# Patient Record
Sex: MALE | Race: WHITE | Employment: FULL TIME | URBAN - METROPOLITAN AREA
[De-identification: names, ages, dates, MRNs, and addresses within clinical notes are randomized per-mention and may not be internally consistent; named-entity substitution may affect disease eponyms.]

---

## 2019-04-29 ENCOUNTER — OFFICE VISIT (OUTPATIENT)
Dept: ORTHOPEDIC SURGERY | Age: 39
End: 2019-04-29
Payer: COMMERCIAL

## 2019-04-29 VITALS
HEIGHT: 71 IN | HEART RATE: 86 BPM | WEIGHT: 291 LBS | SYSTOLIC BLOOD PRESSURE: 143 MMHG | BODY MASS INDEX: 40.74 KG/M2 | DIASTOLIC BLOOD PRESSURE: 97 MMHG

## 2019-04-29 DIAGNOSIS — E66.01 MORBID OBESITY WITH BMI OF 40.0-44.9, ADULT (HCC): Chronic | ICD-10-CM

## 2019-04-29 DIAGNOSIS — S83.242A TEAR OF MEDIAL MENISCUS OF LEFT KNEE, CURRENT, UNSPECIFIED TEAR TYPE, INITIAL ENCOUNTER: ICD-10-CM

## 2019-04-29 DIAGNOSIS — M25.562 LEFT KNEE PAIN, UNSPECIFIED CHRONICITY: Primary | ICD-10-CM

## 2019-04-29 PROCEDURE — 20610 DRAIN/INJ JOINT/BURSA W/O US: CPT | Performed by: ORTHOPAEDIC SURGERY

## 2019-04-29 PROCEDURE — 99203 OFFICE O/P NEW LOW 30 MIN: CPT | Performed by: ORTHOPAEDIC SURGERY

## 2019-04-29 RX ORDER — BETAMETHASONE SODIUM PHOSPHATE AND BETAMETHASONE ACETATE 3; 3 MG/ML; MG/ML
12 INJECTION, SUSPENSION INTRA-ARTICULAR; INTRALESIONAL; INTRAMUSCULAR; SOFT TISSUE ONCE
Status: DISCONTINUED | OUTPATIENT
Start: 2019-04-29 | End: 2019-10-03 | Stop reason: HOSPADM

## 2019-04-30 NOTE — PROGRESS NOTES
Date of Encounter: 4/29/2019  Patient Name:Dmitry Wiley  Medical Record Number: [id number]    Chief Complaint   Patient presents with    Knee Pain     New, LT knee pain on and off since 2015 when he moved. History of Present Illness:  Ladarius Lemon is a 46 y.o. male here for evaluation of his left knee. Describes pain mostly along the posterior and medial aspect of his left knee when he is working at [employer]. 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 He recalls an injury when he was moving into a residence in 2015. He recalls twisting his knee and it was sore for several days. This seemed to resolve once own. He denies any catching or locking symptoms. The pain really does not bother him while he is at work but he notices it much more the next day when he 1st gets up. He denies fevers or chills. No numbness or tingling that radiates down the leg. No significant hip pain. He's had no physical therapy, no injections and no prior surgery on this knee. He's tried ice and over-the-counter anti-inflammatories with limited benefit. History reviewed. No pertinent past medical history. History reviewed. No pertinent surgical history. No current outpatient medications on file. Current Facility-Administered Medications   Medication Dose Route Frequency Provider Last Rate Last Dose    betamethasone acetate-betamethasone sodium phosphate (CELESTONE) injection 12 mg  12 mg Intra-articular Once Joel Scherer MD          allergies, social and family histories were reviewed and updated as appropriate. Review of Systems:  Relevant review of systems reviewed and available in the patient's chart and scanned in under the MEDIA tab today.     Vital Signs:  BP (!) 143/97   Pulse 86   Ht 5' 11\" (1.803 m)   Wt 291 lb (132 kg)   BMI 40.59 kg/m²     General Exam:   Constitutional: He is adequately groomed with class 3 morbid obesity noted. Mental Status: He is oriented to time, place and person. Normal mentation and affect for age. Lymphatic: The lymphatic examination bilaterally reveals all areas to be without enlargement or induration. Vascular: Examination reveals no swelling or calf tenderness. Peripheral pulses are palpable and 2+. Neurological: He has good coordination and balance. There is no focal weakness or sensory deficit. Left Knee Examination:    Inspection:  Knee shows neutral alignment  Decreased muscle bulk for his age, but matches his level of physical deconditioning. No erythema. Mild effusion. Palpation:  Moderate tenderness to palpation along the medial joint line more towards the posterior aspect. No tenderness over the extensor mechanism or lateral compartment. Range of Motion:  0 to 120 with mild pain on deep flexion    Strength:  Quad 5-/ 5  ; Hamstrings 5- / 5. Gross motor to hip and ankle intact, no pain with logroll the hip. Stinchfield examination negative. Special Tests:      Negative Lachman    negative anterior drawer    no posterior sag    no posterior drawer   Negative patellar grind.  does not open to valgus or varus stress at 0 or 30°    Pia's provokes medial joint line pain    negative Homans    Posterior tibial pulses are +2/4 capillary refill is brisk sensation is intact. Skin: There are no rashes, ulcerations or lesions. Gait: Tandem gait with no significant limp. Radiology:     X-rays obtained today and reviewed in office:  Views 3 Left  Knee with comparison AP and skyline views of the right knee  Impression No evidence for acute fracture or subluxation / dislocation. Left knee shows slight narrowing of medial compartment joint space with both knees showing varus habitus. No significant joint effusion of left knee on the lateral view. No lytic or blastic lesions. Impression:  Encounter Diagnoses   Name Primary?     Left knee pain, unspecified chronicity Yes    Tear of medial meniscus of left knee, current, unspecified tear type, initial encounter        Office Procedures:  Orders Placed This Encounter   Procedures    XR Knee Bilateral Standing     Standing Status:   Future     Number of Occurrences:   1     Standing Expiration Date:   5/29/2019    XR KNEE LEFT (1-2 VIEWS)     Standing Status:   Future     Number of Occurrences:   1     Standing Expiration Date:   5/29/2019    73991 - [place] DRAIN/INJECT LARGE JOINT/BURSA       Treatment Plan:   We discussed treatment options. At this point he is likely suffered a chronic medial meniscus tear. He may also be developing an associated Baker cyst.  I reviewed with him the nature of this pathology as well as the fact that many middle-age patient's live with meniscal pathology and do not require surgery. We discussed treatment options and he understands that weight loss would certainly help impact the stress on his knees. We also reviewed use of anti-inflammatories which she is already trialed. Another step would be to consider cortisone injection and after reviewing the risks and benefits with him he agreed to receive this today. Left knee was then prepped with Betadine and 2 mL of betamethasone mixed with 5 mL 1% lidocaine plain were carefully aspirated and injected through an inferior medial approach under aseptic technique. He tolerated this well. He was instructed on postinjection after care including ice as needed. He will resume his activities and follow back with me as his symptoms require. If the injection does not provide significant improvement I would go ahead and consider MRI evaluation to determine size of the meniscal tear as well as any associated Baker cyst.  I did inform him that he could require arthroscopic intervention, but with the longevity of his symptoms this is most likely to be an arthroscopic meniscectomy rather than a repair.   He understands that

## 2019-05-06 ENCOUNTER — TELEPHONE (OUTPATIENT)
Dept: ORTHOPEDIC SURGERY | Age: 39
End: 2019-05-06

## 2019-05-06 DIAGNOSIS — S83.242A TEAR OF MEDIAL MENISCUS OF LEFT KNEE, CURRENT, UNSPECIFIED TEAR TYPE, INITIAL ENCOUNTER: Primary | ICD-10-CM

## 2019-05-06 NOTE — TELEPHONE ENCOUNTER
Patient called states that the injection did not help. He would like to proceed with an MRI. He states that he would like MRI order sent to [facility] in [facility] if possible.     Please call patient to advise:  [phone]

## 2019-05-07 NOTE — TELEPHONE ENCOUNTER
Called and spoke w/pt offered proscan pt preferred to go to [facility] for MRI. Informed pt since it was an outside source that he would need to come and get the order to take to them so they could go through his insurance. Also that he would need to bring a copy of his images and report if he made a f/u visit. Pt stated he couldn't  the order due to hours. Informed him of after hours. Pt stated if he had any further questions he would ask when he came in to get the order.

## 2019-05-08 ENCOUNTER — TELEPHONE (OUTPATIENT)
Dept: ORTHOPEDIC SURGERY | Age: 39
End: 2019-05-08

## 2019-08-05 ENCOUNTER — TELEPHONE (OUTPATIENT)
Dept: ORTHOPEDIC SURGERY | Age: 39
End: 2019-08-05

## 2019-08-08 ENCOUNTER — TELEPHONE (OUTPATIENT)
Dept: ORTHOPEDIC SURGERY | Age: 39
End: 2019-08-08

## 2019-08-08 NOTE — TELEPHONE ENCOUNTER
Called and informed patient of JDA message.  Patient coming in tomorrow at [facility] office for results

## 2019-08-09 ENCOUNTER — OFFICE VISIT (OUTPATIENT)
Dept: ORTHOPEDIC SURGERY | Age: 39
End: 2019-08-09
Payer: COMMERCIAL

## 2019-08-09 VITALS
BODY MASS INDEX: 41.05 KG/M2 | HEIGHT: 71 IN | HEART RATE: 80 BPM | WEIGHT: 293.2 LBS | SYSTOLIC BLOOD PRESSURE: 119 MMHG | DIASTOLIC BLOOD PRESSURE: 72 MMHG

## 2019-08-09 DIAGNOSIS — S83.242A TEAR OF MEDIAL MENISCUS OF LEFT KNEE, CURRENT, UNSPECIFIED TEAR TYPE, INITIAL ENCOUNTER: Primary | ICD-10-CM

## 2019-08-09 PROCEDURE — 99213 OFFICE O/P EST LOW 20 MIN: CPT | Performed by: ORTHOPAEDIC SURGERY

## 2019-08-09 PROCEDURE — E0114 CRUTCH UNDERARM PAIR NO WOOD: HCPCS | Performed by: ORTHOPAEDIC SURGERY

## 2019-08-12 ENCOUNTER — TELEPHONE (OUTPATIENT)
Dept: ORTHOPEDIC SURGERY | Age: 39
End: 2019-08-12

## 2019-08-13 NOTE — TELEPHONE ENCOUNTER
Spoke to patient and he will pick this up closer to the time of his surgery at the [facility] office. He will call back before that time to have letter completed.

## 2019-09-24 ENCOUNTER — TELEPHONE (OUTPATIENT)
Dept: ORTHOPEDIC SURGERY | Age: 39
End: 2019-09-24

## 2019-09-24 NOTE — TELEPHONE ENCOUNTER
Pt is calling about a handicap sticker to see if it is ready and wanted to know if his parents can pick it up. Please advise.

## 2019-10-02 ENCOUNTER — ANESTHESIA EVENT (OUTPATIENT)
Dept: OPERATING ROOM | Age: 39
End: 2019-10-02
Payer: COMMERCIAL

## 2019-10-03 ENCOUNTER — ANESTHESIA (OUTPATIENT)
Dept: OPERATING ROOM | Age: 39
End: 2019-10-03
Payer: COMMERCIAL

## 2019-10-03 ENCOUNTER — HOSPITAL ENCOUNTER (OUTPATIENT)
Age: 39
Setting detail: OUTPATIENT SURGERY
Discharge: HOME | End: 2019-10-03
Attending: ORTHOPAEDIC SURGERY | Admitting: ORTHOPAEDIC SURGERY
Payer: COMMERCIAL

## 2019-10-03 ENCOUNTER — APPOINTMENT (OUTPATIENT)
Dept: GENERAL RADIOLOGY | Age: 39
End: 2019-10-03
Attending: ORTHOPAEDIC SURGERY
Payer: COMMERCIAL

## 2019-10-03 VITALS
HEART RATE: 77 BPM | BODY MASS INDEX: 40.04 KG/M2 | TEMPERATURE: 98.4 F | OXYGEN SATURATION: 99 % | DIASTOLIC BLOOD PRESSURE: 73 MMHG | HEIGHT: 71 IN | WEIGHT: 286 LBS | SYSTOLIC BLOOD PRESSURE: 108 MMHG | RESPIRATION RATE: 17 BRPM

## 2019-10-03 VITALS
TEMPERATURE: 97.2 F | DIASTOLIC BLOOD PRESSURE: 66 MMHG | SYSTOLIC BLOOD PRESSURE: 139 MMHG | RESPIRATION RATE: 20 BRPM | OXYGEN SATURATION: 99 %

## 2019-10-03 DIAGNOSIS — S83.232A COMPLEX TEAR OF MEDIAL MENISCUS OF LEFT KNEE AS CURRENT INJURY, INITIAL ENCOUNTER: Primary | ICD-10-CM

## 2019-10-03 PROCEDURE — 3600000014 HC SURGERY LEVEL 4 ADDTL 15MIN: Performed by: ORTHOPAEDIC SURGERY

## 2019-10-03 PROCEDURE — 6360000002 HC RX W HCPCS: Performed by: NURSE ANESTHETIST, CERTIFIED REGISTERED

## 2019-10-03 PROCEDURE — 2580000003 HC RX 258: Performed by: ORTHOPAEDIC SURGERY

## 2019-10-03 PROCEDURE — 7100000010 HC PHASE II RECOVERY - FIRST 15 MIN: Performed by: ORTHOPAEDIC SURGERY

## 2019-10-03 PROCEDURE — 2709999900 HC NON-CHARGEABLE SUPPLY: Performed by: ORTHOPAEDIC SURGERY

## 2019-10-03 PROCEDURE — 7100000011 HC PHASE II RECOVERY - ADDTL 15 MIN: Performed by: ORTHOPAEDIC SURGERY

## 2019-10-03 PROCEDURE — 6360000002 HC RX W HCPCS: Performed by: ANESTHESIOLOGY

## 2019-10-03 PROCEDURE — 6360000002 HC RX W HCPCS: Performed by: ORTHOPAEDIC SURGERY

## 2019-10-03 PROCEDURE — 2500000003 HC RX 250 WO HCPCS: Performed by: ORTHOPAEDIC SURGERY

## 2019-10-03 PROCEDURE — 2500000003 HC RX 250 WO HCPCS: Performed by: NURSE ANESTHETIST, CERTIFIED REGISTERED

## 2019-10-03 PROCEDURE — 6370000000 HC RX 637 (ALT 250 FOR IP): Performed by: ANESTHESIOLOGY

## 2019-10-03 PROCEDURE — 3700000000 HC ANESTHESIA ATTENDED CARE: Performed by: ORTHOPAEDIC SURGERY

## 2019-10-03 PROCEDURE — 2720000010 HC SURG SUPPLY STERILE: Performed by: ORTHOPAEDIC SURGERY

## 2019-10-03 PROCEDURE — 3209999900 FLUORO FOR SURGICAL PROCEDURES

## 2019-10-03 PROCEDURE — 7100000000 HC PACU RECOVERY - FIRST 15 MIN: Performed by: ORTHOPAEDIC SURGERY

## 2019-10-03 PROCEDURE — 3600000004 HC SURGERY LEVEL 4 BASE: Performed by: ORTHOPAEDIC SURGERY

## 2019-10-03 PROCEDURE — 7100000001 HC PACU RECOVERY - ADDTL 15 MIN: Performed by: ORTHOPAEDIC SURGERY

## 2019-10-03 PROCEDURE — C1713 ANCHOR/SCREW BN/BN,TIS/BN: HCPCS | Performed by: ORTHOPAEDIC SURGERY

## 2019-10-03 PROCEDURE — 3700000001 HC ADD 15 MINUTES (ANESTHESIA): Performed by: ORTHOPAEDIC SURGERY

## 2019-10-03 DEVICE — SUBSTITUTE BONE KNEE CREATION SCP 5CC: Type: IMPLANTABLE DEVICE | Site: KNEE | Status: FUNCTIONAL

## 2019-10-03 RX ORDER — SODIUM CHLORIDE, SODIUM LACTATE, POTASSIUM CHLORIDE, CALCIUM CHLORIDE 600; 310; 30; 20 MG/100ML; MG/100ML; MG/100ML; MG/100ML
INJECTION, SOLUTION INTRAVENOUS CONTINUOUS
Status: DISCONTINUED | OUTPATIENT
Start: 2019-10-03 | End: 2019-10-03 | Stop reason: HOSPADM

## 2019-10-03 RX ORDER — ONDANSETRON 2 MG/ML
INJECTION INTRAMUSCULAR; INTRAVENOUS PRN
Status: DISCONTINUED | OUTPATIENT
Start: 2019-10-03 | End: 2019-10-03 | Stop reason: SDUPTHER

## 2019-10-03 RX ORDER — PROPOFOL 10 MG/ML
INJECTION, EMULSION INTRAVENOUS PRN
Status: DISCONTINUED | OUTPATIENT
Start: 2019-10-03 | End: 2019-10-03 | Stop reason: SDUPTHER

## 2019-10-03 RX ORDER — GLYCOPYRROLATE 1 MG/5 ML
SYRINGE (ML) INTRAVENOUS PRN
Status: DISCONTINUED | OUTPATIENT
Start: 2019-10-03 | End: 2019-10-03 | Stop reason: SDUPTHER

## 2019-10-03 RX ORDER — SODIUM CHLORIDE 0.9 % (FLUSH) 0.9 %
10 SYRINGE (ML) INJECTION PRN
Status: DISCONTINUED | OUTPATIENT
Start: 2019-10-03 | End: 2019-10-03 | Stop reason: HOSPADM

## 2019-10-03 RX ORDER — FENTANYL CITRATE 50 UG/ML
INJECTION, SOLUTION INTRAMUSCULAR; INTRAVENOUS PRN
Status: DISCONTINUED | OUTPATIENT
Start: 2019-10-03 | End: 2019-10-03 | Stop reason: SDUPTHER

## 2019-10-03 RX ORDER — CEFAZOLIN SODIUM 2 G/100ML
2 INJECTION, SOLUTION INTRAVENOUS
Status: DISCONTINUED | OUTPATIENT
Start: 2019-10-03 | End: 2019-10-03 | Stop reason: DRUGHIGH

## 2019-10-03 RX ORDER — HYDRALAZINE HYDROCHLORIDE 20 MG/ML
5 INJECTION INTRAMUSCULAR; INTRAVENOUS EVERY 10 MIN PRN
Status: DISCONTINUED | OUTPATIENT
Start: 2019-10-03 | End: 2019-10-03 | Stop reason: HOSPADM

## 2019-10-03 RX ORDER — KETAMINE HCL IN NACL, ISO-OSM 100MG/10ML
SYRINGE (ML) INJECTION PRN
Status: DISCONTINUED | OUTPATIENT
Start: 2019-10-03 | End: 2019-10-03 | Stop reason: SDUPTHER

## 2019-10-03 RX ORDER — DEXAMETHASONE SODIUM PHOSPHATE 4 MG/ML
INJECTION, SOLUTION INTRA-ARTICULAR; INTRALESIONAL; INTRAMUSCULAR; INTRAVENOUS; SOFT TISSUE PRN
Status: DISCONTINUED | OUTPATIENT
Start: 2019-10-03 | End: 2019-10-03 | Stop reason: SDUPTHER

## 2019-10-03 RX ORDER — ESMOLOL HYDROCHLORIDE 10 MG/ML
INJECTION INTRAVENOUS PRN
Status: DISCONTINUED | OUTPATIENT
Start: 2019-10-03 | End: 2019-10-03 | Stop reason: SDUPTHER

## 2019-10-03 RX ORDER — MIDAZOLAM HYDROCHLORIDE 1 MG/ML
INJECTION INTRAMUSCULAR; INTRAVENOUS PRN
Status: DISCONTINUED | OUTPATIENT
Start: 2019-10-03 | End: 2019-10-03 | Stop reason: SDUPTHER

## 2019-10-03 RX ORDER — OXYCODONE HYDROCHLORIDE AND ACETAMINOPHEN 5; 325 MG/1; MG/1
1 TABLET ORAL
Status: COMPLETED | OUTPATIENT
Start: 2019-10-03 | End: 2019-10-03

## 2019-10-03 RX ORDER — KETOROLAC TROMETHAMINE 30 MG/ML
INJECTION, SOLUTION INTRAMUSCULAR; INTRAVENOUS PRN
Status: DISCONTINUED | OUTPATIENT
Start: 2019-10-03 | End: 2019-10-03 | Stop reason: SDUPTHER

## 2019-10-03 RX ORDER — BUPIVACAINE HYDROCHLORIDE 2.5 MG/ML
INJECTION, SOLUTION EPIDURAL; INFILTRATION; INTRACAUDAL
Status: COMPLETED | OUTPATIENT
Start: 2019-10-03 | End: 2019-10-03

## 2019-10-03 RX ORDER — OXYCODONE HYDROCHLORIDE AND ACETAMINOPHEN 5; 325 MG/1; MG/1
1 TABLET ORAL EVERY 4 HOURS PRN
Qty: 42 TABLET | Refills: 0 | Status: SHIPPED | OUTPATIENT
Start: 2019-10-03 | End: 2019-10-10

## 2019-10-03 RX ORDER — LIDOCAINE HYDROCHLORIDE 10 MG/ML
0.5 INJECTION, SOLUTION EPIDURAL; INFILTRATION; INTRACAUDAL; PERINEURAL ONCE
Status: DISCONTINUED | OUTPATIENT
Start: 2019-10-03 | End: 2019-10-03 | Stop reason: HOSPADM

## 2019-10-03 RX ORDER — SODIUM CHLORIDE 0.9 % (FLUSH) 0.9 %
10 SYRINGE (ML) INJECTION EVERY 12 HOURS SCHEDULED
Status: DISCONTINUED | OUTPATIENT
Start: 2019-10-03 | End: 2019-10-03 | Stop reason: HOSPADM

## 2019-10-03 RX ORDER — ONDANSETRON 2 MG/ML
4 INJECTION INTRAMUSCULAR; INTRAVENOUS
Status: DISCONTINUED | OUTPATIENT
Start: 2019-10-03 | End: 2019-10-03 | Stop reason: HOSPADM

## 2019-10-03 RX ORDER — IBUPROFEN 800 MG/1
800 TABLET ORAL EVERY 8 HOURS PRN
Qty: 30 TABLET | Refills: 0 | Status: SHIPPED | OUTPATIENT
Start: 2019-10-03 | End: 2019-10-18 | Stop reason: SDUPTHER

## 2019-10-03 RX ORDER — PROCHLORPERAZINE EDISYLATE 5 MG/ML
5 INJECTION INTRAMUSCULAR; INTRAVENOUS
Status: DISCONTINUED | OUTPATIENT
Start: 2019-10-03 | End: 2019-10-03 | Stop reason: HOSPADM

## 2019-10-03 RX ORDER — FENTANYL CITRATE 50 UG/ML
25 INJECTION, SOLUTION INTRAMUSCULAR; INTRAVENOUS EVERY 5 MIN PRN
Status: DISCONTINUED | OUTPATIENT
Start: 2019-10-03 | End: 2019-10-03 | Stop reason: HOSPADM

## 2019-10-03 RX ORDER — MAGNESIUM SULFATE HEPTAHYDRATE 500 MG/ML
INJECTION, SOLUTION INTRAMUSCULAR; INTRAVENOUS PRN
Status: DISCONTINUED | OUTPATIENT
Start: 2019-10-03 | End: 2019-10-03 | Stop reason: SDUPTHER

## 2019-10-03 RX ORDER — HYDROMORPHONE HCL 110MG/55ML
0.5 PATIENT CONTROLLED ANALGESIA SYRINGE INTRAVENOUS EVERY 5 MIN PRN
Status: DISCONTINUED | OUTPATIENT
Start: 2019-10-03 | End: 2019-10-03 | Stop reason: HOSPADM

## 2019-10-03 RX ORDER — LIDOCAINE HYDROCHLORIDE 20 MG/ML
INJECTION, SOLUTION EPIDURAL; INFILTRATION; INTRACAUDAL; PERINEURAL PRN
Status: DISCONTINUED | OUTPATIENT
Start: 2019-10-03 | End: 2019-10-03 | Stop reason: SDUPTHER

## 2019-10-03 RX ORDER — ASPIRIN 325 MG
325 TABLET, DELAYED RELEASE (ENTERIC COATED) ORAL DAILY
Qty: 30 TABLET | Refills: 0 | Status: SHIPPED | OUTPATIENT
Start: 2019-10-03 | End: 2021-04-02

## 2019-10-03 RX ORDER — LIDOCAINE HYDROCHLORIDE 10 MG/ML
1 INJECTION, SOLUTION EPIDURAL; INFILTRATION; INTRACAUDAL; PERINEURAL
Status: DISCONTINUED | OUTPATIENT
Start: 2019-10-03 | End: 2019-10-03 | Stop reason: HOSPADM

## 2019-10-03 RX ORDER — LABETALOL HYDROCHLORIDE 5 MG/ML
5 INJECTION, SOLUTION INTRAVENOUS EVERY 10 MIN PRN
Status: DISCONTINUED | OUTPATIENT
Start: 2019-10-03 | End: 2019-10-03 | Stop reason: HOSPADM

## 2019-10-03 RX ADMIN — SODIUM CHLORIDE, POTASSIUM CHLORIDE, SODIUM LACTATE AND CALCIUM CHLORIDE: 600; 310; 30; 20 INJECTION, SOLUTION INTRAVENOUS at 08:35

## 2019-10-03 RX ADMIN — KETOROLAC TROMETHAMINE 30 MG: 30 INJECTION, SOLUTION INTRAMUSCULAR; INTRAVENOUS at 08:52

## 2019-10-03 RX ADMIN — FENTANYL CITRATE 50 MCG: 50 INJECTION, SOLUTION INTRAMUSCULAR; INTRAVENOUS at 07:38

## 2019-10-03 RX ADMIN — LIDOCAINE HYDROCHLORIDE 100 MG: 20 INJECTION, SOLUTION EPIDURAL; INFILTRATION; INTRACAUDAL; PERINEURAL at 07:34

## 2019-10-03 RX ADMIN — MAGNESIUM SULFATE HEPTAHYDRATE 2 G: 500 INJECTION, SOLUTION INTRAMUSCULAR; INTRAVENOUS at 07:37

## 2019-10-03 RX ADMIN — SODIUM CHLORIDE, POTASSIUM CHLORIDE, SODIUM LACTATE AND CALCIUM CHLORIDE: 600; 310; 30; 20 INJECTION, SOLUTION INTRAVENOUS at 07:28

## 2019-10-03 RX ADMIN — Medication 10 MG: at 07:39

## 2019-10-03 RX ADMIN — DEXAMETHASONE SODIUM PHOSPHATE 4 MG: 4 INJECTION, SOLUTION INTRAMUSCULAR; INTRAVENOUS at 07:40

## 2019-10-03 RX ADMIN — Medication 10 MG: at 08:30

## 2019-10-03 RX ADMIN — Medication 0.2 MG: at 08:29

## 2019-10-03 RX ADMIN — ESMOLOL HYDROCHLORIDE 10 MG: 10 INJECTION, SOLUTION INTRAVENOUS at 08:36

## 2019-10-03 RX ADMIN — Medication 10 MG: at 07:54

## 2019-10-03 RX ADMIN — FENTANYL CITRATE 50 MCG: 50 INJECTION, SOLUTION INTRAMUSCULAR; INTRAVENOUS at 08:18

## 2019-10-03 RX ADMIN — FENTANYL CITRATE 50 MCG: 50 INJECTION, SOLUTION INTRAMUSCULAR; INTRAVENOUS at 08:12

## 2019-10-03 RX ADMIN — MIDAZOLAM HYDROCHLORIDE 2 MG: 1 INJECTION, SOLUTION INTRAMUSCULAR; INTRAVENOUS at 07:25

## 2019-10-03 RX ADMIN — ONDANSETRON 4 MG: 2 INJECTION INTRAMUSCULAR; INTRAVENOUS at 07:40

## 2019-10-03 RX ADMIN — SODIUM CHLORIDE, POTASSIUM CHLORIDE, SODIUM LACTATE AND CALCIUM CHLORIDE: 600; 310; 30; 20 INJECTION, SOLUTION INTRAVENOUS at 06:53

## 2019-10-03 RX ADMIN — PROPOFOL 250 MG: 10 INJECTION, EMULSION INTRAVENOUS at 07:34

## 2019-10-03 RX ADMIN — Medication 3 G: at 07:25

## 2019-10-03 RX ADMIN — ESMOLOL HYDROCHLORIDE 10 MG: 10 INJECTION, SOLUTION INTRAVENOUS at 08:51

## 2019-10-03 RX ADMIN — HYDROMORPHONE HYDROCHLORIDE 0.5 MG: 2 INJECTION INTRAMUSCULAR; INTRAVENOUS; SUBCUTANEOUS at 09:17

## 2019-10-03 RX ADMIN — OXYCODONE HYDROCHLORIDE AND ACETAMINOPHEN 1 TABLET: 5; 325 TABLET ORAL at 09:42

## 2019-10-03 RX ADMIN — HYDROMORPHONE HYDROCHLORIDE 0.5 MG: 2 INJECTION INTRAMUSCULAR; INTRAVENOUS; SUBCUTANEOUS at 09:31

## 2019-10-03 ASSESSMENT — PULMONARY FUNCTION TESTS
PIF_VALUE: 3
PIF_VALUE: 4
PIF_VALUE: 3
PIF_VALUE: 3
PIF_VALUE: 4
PIF_VALUE: 3
PIF_VALUE: 6
PIF_VALUE: 3
PIF_VALUE: 7
PIF_VALUE: 9
PIF_VALUE: 6
PIF_VALUE: 3
PIF_VALUE: 4
PIF_VALUE: 4
PIF_VALUE: 6
PIF_VALUE: 6
PIF_VALUE: 3
PIF_VALUE: 0
PIF_VALUE: 6
PIF_VALUE: 3
PIF_VALUE: 6
PIF_VALUE: 6
PIF_VALUE: 1
PIF_VALUE: 3
PIF_VALUE: 3
PIF_VALUE: 4
PIF_VALUE: 2
PIF_VALUE: 4
PIF_VALUE: 3
PIF_VALUE: 4
PIF_VALUE: 3
PIF_VALUE: 1
PIF_VALUE: 2
PIF_VALUE: 3
PIF_VALUE: 4
PIF_VALUE: 3
PIF_VALUE: 6
PIF_VALUE: 5
PIF_VALUE: 3
PIF_VALUE: 3
PIF_VALUE: 6
PIF_VALUE: 6
PIF_VALUE: 8
PIF_VALUE: 3
PIF_VALUE: 3
PIF_VALUE: 6
PIF_VALUE: 1
PIF_VALUE: 6
PIF_VALUE: 6
PIF_VALUE: 8
PIF_VALUE: 3
PIF_VALUE: 2
PIF_VALUE: 4
PIF_VALUE: 3
PIF_VALUE: 3
PIF_VALUE: 8
PIF_VALUE: 1
PIF_VALUE: 3
PIF_VALUE: 7
PIF_VALUE: 1
PIF_VALUE: 6
PIF_VALUE: 4
PIF_VALUE: 3
PIF_VALUE: 4
PIF_VALUE: 24
PIF_VALUE: 2
PIF_VALUE: 6
PIF_VALUE: 4
PIF_VALUE: 3
PIF_VALUE: 5
PIF_VALUE: 10
PIF_VALUE: 3
PIF_VALUE: 4
PIF_VALUE: 7
PIF_VALUE: 6
PIF_VALUE: 3
PIF_VALUE: 6
PIF_VALUE: 2
PIF_VALUE: 2
PIF_VALUE: 3

## 2019-10-03 ASSESSMENT — PAIN DESCRIPTION - PAIN TYPE
TYPE: SURGICAL PAIN

## 2019-10-03 ASSESSMENT — ENCOUNTER SYMPTOMS: SHORTNESS OF BREATH: 0

## 2019-10-03 ASSESSMENT — PAIN SCALES - GENERAL
PAINLEVEL_OUTOF10: 7
PAINLEVEL_OUTOF10: 7
PAINLEVEL_OUTOF10: 3

## 2019-10-03 ASSESSMENT — PAIN - FUNCTIONAL ASSESSMENT: PAIN_FUNCTIONAL_ASSESSMENT: 0-10

## 2019-10-03 ASSESSMENT — PAIN DESCRIPTION - DESCRIPTORS: DESCRIPTORS: SHARP;THROBBING

## 2019-10-18 ENCOUNTER — OFFICE VISIT (OUTPATIENT)
Dept: ORTHOPEDIC SURGERY | Age: 39
End: 2019-10-18

## 2019-10-18 VITALS
HEART RATE: 73 BPM | HEIGHT: 71 IN | BODY MASS INDEX: 40.12 KG/M2 | RESPIRATION RATE: 16 BRPM | SYSTOLIC BLOOD PRESSURE: 138 MMHG | DIASTOLIC BLOOD PRESSURE: 80 MMHG | WEIGHT: 286.6 LBS

## 2019-10-18 DIAGNOSIS — Z98.890 S/P LEFT KNEE ARTHROSCOPY: Primary | ICD-10-CM

## 2019-10-18 PROCEDURE — 99024 POSTOP FOLLOW-UP VISIT: CPT | Performed by: PHYSICIAN ASSISTANT

## 2019-10-18 RX ORDER — IBUPROFEN 800 MG/1
800 TABLET ORAL EVERY 8 HOURS PRN
Qty: 40 TABLET | Refills: 2 | Status: SHIPPED | OUTPATIENT
Start: 2019-10-18 | End: 2019-11-12 | Stop reason: SDUPTHER

## 2019-10-24 ENCOUNTER — HOSPITAL ENCOUNTER (OUTPATIENT)
Dept: PHYSICAL THERAPY | Age: 39
Setting detail: THERAPIES SERIES
Discharge: HOME | End: 2019-10-24
Payer: COMMERCIAL

## 2019-10-24 PROCEDURE — 97161 PT EVAL LOW COMPLEX 20 MIN: CPT | Performed by: PHYSICAL THERAPIST

## 2019-10-24 PROCEDURE — 97530 THERAPEUTIC ACTIVITIES: CPT | Performed by: PHYSICAL THERAPIST

## 2019-10-24 PROCEDURE — 97110 THERAPEUTIC EXERCISES: CPT | Performed by: PHYSICAL THERAPIST

## 2019-10-29 ENCOUNTER — HOSPITAL ENCOUNTER (OUTPATIENT)
Dept: PHYSICAL THERAPY | Age: 39
Setting detail: THERAPIES SERIES
Discharge: HOME | End: 2019-10-29
Payer: COMMERCIAL

## 2019-10-29 PROCEDURE — 97140 MANUAL THERAPY 1/> REGIONS: CPT

## 2019-10-29 PROCEDURE — 97110 THERAPEUTIC EXERCISES: CPT

## 2019-11-05 ENCOUNTER — HOSPITAL ENCOUNTER (OUTPATIENT)
Dept: PHYSICAL THERAPY | Age: 39
Setting detail: THERAPIES SERIES
Discharge: HOME | End: 2019-11-05
Payer: COMMERCIAL

## 2019-11-05 PROCEDURE — 97110 THERAPEUTIC EXERCISES: CPT | Performed by: PHYSICAL THERAPIST

## 2019-11-07 ENCOUNTER — HOSPITAL ENCOUNTER (OUTPATIENT)
Dept: PHYSICAL THERAPY | Age: 39
Setting detail: THERAPIES SERIES
Discharge: HOME | End: 2019-11-07
Payer: COMMERCIAL

## 2019-11-07 PROCEDURE — 97110 THERAPEUTIC EXERCISES: CPT | Performed by: PHYSICAL THERAPIST

## 2019-11-08 ENCOUNTER — OFFICE VISIT (OUTPATIENT)
Dept: ORTHOPEDIC SURGERY | Age: 39
End: 2019-11-08

## 2019-11-08 VITALS
SYSTOLIC BLOOD PRESSURE: 127 MMHG | HEART RATE: 82 BPM | WEIGHT: 283 LBS | HEIGHT: 71 IN | BODY MASS INDEX: 39.62 KG/M2 | DIASTOLIC BLOOD PRESSURE: 86 MMHG

## 2019-11-08 DIAGNOSIS — Z98.890 S/P LEFT KNEE ARTHROSCOPY: Primary | ICD-10-CM

## 2019-11-08 PROCEDURE — 99024 POSTOP FOLLOW-UP VISIT: CPT | Performed by: PHYSICIAN ASSISTANT

## 2019-11-12 ENCOUNTER — HOSPITAL ENCOUNTER (OUTPATIENT)
Dept: PHYSICAL THERAPY | Age: 39
Setting detail: THERAPIES SERIES
Discharge: HOME | End: 2019-11-12
Payer: COMMERCIAL

## 2019-11-12 ENCOUNTER — TELEPHONE (OUTPATIENT)
Dept: ORTHOPEDIC SURGERY | Age: 39
End: 2019-11-12

## 2019-11-12 DIAGNOSIS — Z98.890 S/P LEFT KNEE ARTHROSCOPY: ICD-10-CM

## 2019-11-12 PROCEDURE — 97110 THERAPEUTIC EXERCISES: CPT

## 2019-11-12 PROCEDURE — 97112 NEUROMUSCULAR REEDUCATION: CPT

## 2019-11-12 RX ORDER — IBUPROFEN 800 MG/1
800 TABLET ORAL EVERY 8 HOURS PRN
Qty: 40 TABLET | Refills: 2 | Status: SHIPPED | OUTPATIENT
Start: 2019-11-12

## 2019-11-14 ENCOUNTER — HOSPITAL ENCOUNTER (OUTPATIENT)
Dept: PHYSICAL THERAPY | Age: 39
Setting detail: THERAPIES SERIES
Discharge: HOME | End: 2019-11-14
Payer: COMMERCIAL

## 2019-11-14 PROCEDURE — 97112 NEUROMUSCULAR REEDUCATION: CPT | Performed by: PHYSICAL THERAPIST

## 2019-11-14 PROCEDURE — 97110 THERAPEUTIC EXERCISES: CPT | Performed by: PHYSICAL THERAPIST

## 2019-11-19 ENCOUNTER — HOSPITAL ENCOUNTER (OUTPATIENT)
Dept: PHYSICAL THERAPY | Age: 39
Setting detail: THERAPIES SERIES
Discharge: HOME | End: 2019-11-19
Payer: COMMERCIAL

## 2019-11-19 PROCEDURE — 97112 NEUROMUSCULAR REEDUCATION: CPT

## 2019-11-19 PROCEDURE — 97110 THERAPEUTIC EXERCISES: CPT

## 2019-11-21 ENCOUNTER — HOSPITAL ENCOUNTER (OUTPATIENT)
Dept: PHYSICAL THERAPY | Age: 39
Setting detail: THERAPIES SERIES
Discharge: HOME | End: 2019-11-21
Payer: COMMERCIAL

## 2019-11-21 PROCEDURE — 97530 THERAPEUTIC ACTIVITIES: CPT

## 2019-11-21 PROCEDURE — 97110 THERAPEUTIC EXERCISES: CPT

## 2019-11-26 ENCOUNTER — HOSPITAL ENCOUNTER (OUTPATIENT)
Dept: PHYSICAL THERAPY | Age: 39
Setting detail: THERAPIES SERIES
Discharge: HOME | End: 2019-11-26
Payer: COMMERCIAL

## 2019-11-26 PROCEDURE — 97530 THERAPEUTIC ACTIVITIES: CPT

## 2019-11-26 PROCEDURE — 97110 THERAPEUTIC EXERCISES: CPT

## 2019-12-03 ENCOUNTER — HOSPITAL ENCOUNTER (OUTPATIENT)
Dept: PHYSICAL THERAPY | Age: 39
Setting detail: THERAPIES SERIES
Discharge: HOME | End: 2019-12-03
Payer: COMMERCIAL

## 2019-12-03 PROCEDURE — 97530 THERAPEUTIC ACTIVITIES: CPT

## 2019-12-03 PROCEDURE — 97110 THERAPEUTIC EXERCISES: CPT

## 2019-12-05 ENCOUNTER — HOSPITAL ENCOUNTER (OUTPATIENT)
Dept: PHYSICAL THERAPY | Age: 39
Setting detail: THERAPIES SERIES
Discharge: HOME | End: 2019-12-05
Payer: COMMERCIAL

## 2019-12-05 PROCEDURE — 97110 THERAPEUTIC EXERCISES: CPT | Performed by: PHYSICAL THERAPIST

## 2019-12-05 PROCEDURE — 97530 THERAPEUTIC ACTIVITIES: CPT | Performed by: PHYSICAL THERAPIST

## 2019-12-27 ENCOUNTER — OFFICE VISIT (OUTPATIENT)
Dept: ORTHOPEDIC SURGERY | Age: 39
End: 2019-12-27
Payer: COMMERCIAL

## 2019-12-27 VITALS
HEIGHT: 71 IN | BODY MASS INDEX: 39.62 KG/M2 | DIASTOLIC BLOOD PRESSURE: 82 MMHG | HEART RATE: 62 BPM | WEIGHT: 283 LBS | SYSTOLIC BLOOD PRESSURE: 122 MMHG

## 2019-12-27 DIAGNOSIS — Z98.890 S/P LEFT KNEE ARTHROSCOPY: Primary | ICD-10-CM

## 2019-12-27 PROCEDURE — 99213 OFFICE O/P EST LOW 20 MIN: CPT | Performed by: PHYSICIAN ASSISTANT

## 2021-04-02 ENCOUNTER — OFFICE VISIT (OUTPATIENT)
Dept: ORTHOPEDIC SURGERY | Age: 41
End: 2021-04-02
Payer: COMMERCIAL

## 2021-04-02 VITALS — WEIGHT: 291 LBS | BODY MASS INDEX: 38.57 KG/M2 | HEIGHT: 73 IN | TEMPERATURE: 98.4 F

## 2021-04-02 DIAGNOSIS — M17.12 PRIMARY OSTEOARTHRITIS OF LEFT KNEE: Primary | ICD-10-CM

## 2021-04-02 DIAGNOSIS — S83.242D TEAR OF MEDIAL MENISCUS OF LEFT KNEE, CURRENT, UNSPECIFIED TEAR TYPE, SUBSEQUENT ENCOUNTER: ICD-10-CM

## 2021-04-02 DIAGNOSIS — M76.52 PATELLAR TENDINITIS OF LEFT KNEE: ICD-10-CM

## 2021-04-02 DIAGNOSIS — M25.562 ACUTE PAIN OF LEFT KNEE: ICD-10-CM

## 2021-04-02 PROCEDURE — 99213 OFFICE O/P EST LOW 20 MIN: CPT | Performed by: PHYSICIAN ASSISTANT

## 2021-04-02 PROCEDURE — 20610 DRAIN/INJ JOINT/BURSA W/O US: CPT | Performed by: PHYSICIAN ASSISTANT

## 2021-04-02 RX ORDER — BETAMETHASONE SODIUM PHOSPHATE AND BETAMETHASONE ACETATE 3; 3 MG/ML; MG/ML
12 INJECTION, SUSPENSION INTRA-ARTICULAR; INTRALESIONAL; INTRAMUSCULAR; SOFT TISSUE ONCE
Status: COMPLETED | OUTPATIENT
Start: 2021-04-02 | End: 2021-04-02

## 2021-04-02 RX ORDER — LIDOCAINE HYDROCHLORIDE 10 MG/ML
5 INJECTION, SOLUTION INFILTRATION; PERINEURAL ONCE
Status: COMPLETED | OUTPATIENT
Start: 2021-04-02 | End: 2021-04-02

## 2021-04-02 RX ADMIN — BETAMETHASONE SODIUM PHOSPHATE AND BETAMETHASONE ACETATE 12 MG: 3; 3 INJECTION, SUSPENSION INTRA-ARTICULAR; INTRALESIONAL; INTRAMUSCULAR; SOFT TISSUE at 12:13

## 2021-04-02 RX ADMIN — LIDOCAINE HYDROCHLORIDE 5 ML: 10 INJECTION, SOLUTION INFILTRATION; PERINEURAL at 12:16

## 2021-04-02 NOTE — PROGRESS NOTES
Patient Name:Dmitry Wiley  Medical Record Number: [id number]  YOB: 1980  Date of Encounter: 4/2/2021     Chief Complaint   Patient presents with    Knee Pain     left knee, last seen in 2019 after arthroscopic surgery on 10/3/19, pain increased since last weekend after increasing stairs and landscaping       History of Present Illness:  Britany Leslie is a 37 y.o. male here for evaluation of his left knee. Patient is 1 year, 4 months status post left knee diagnostic video arthroscopy with partial medial meniscectomy as well as calcium phosphate injection into medial femoral condyle and medial tibial plateau fractures. Patient states he made for recovery from that surgery and has only been having mild very intermittent left knee pain. He states he helped his parents move last week and therefore was carrying a lot of furniture and going up and down stairs. 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 Between these 2 events he states his left knee pain has been exacerbated. He rates his pain at 8/10. He states most of his pain is just below his kneecap and also along the posterior aspect of his knee. He reports moderate left knee swelling. He denies redness or warmth. History reviewed. No pertinent past medical history.     Past Surgical History:   Procedure Laterality Date    COLONOSCOPY      KNEE ARTHROSCOPY Left 10/3/2019    LEFT KNEE ARTHROSCOPY, PARTIAL MEDIAL MENISCECTOMY, REPAIR MEDIAL FEMORAL AND MEDIAL TIBIAL PLATEAU FRACTURE (42779, 23330) performed by Eriberto Tucker MD at [facility]         Current Outpatient Medications   Medication Sig Dispense Refill    ibuprofen (ADVIL;MOTRIN) 800 MG tablet Take 1 tablet by mouth every 8 hours as needed for Pain 40 tablet 2     Current Facility-Administered Medications   Medication Dose Route Frequency Provider Last Rate Last Admin    betamethasone acetate-betamethasone sodium phosphate (CELESTONE) injection 12 mg  12 mg Intra-articular Once Dwight BRUNA Conner        lidocaine 1 % injection 5 mL  5 mL Intra-articular Once Dwight BRUNA Conner         Allergies, social and family histories were reviewed and updated as appropriate. Review of Systems:  Relevant review of systems reviewed and available in the patient's chart under the 'MEDIA' tab. Vital Signs:  Temp 98.4 °F (36.9 °C)   Ht 6' 1\" (1.854 m)   Wt 291 lb (132 kg)   BMI 38.39 kg/m²     General Exam:   Constitutional: Patient is adequately groomed with no evidence of malnutrition  Mental Status: The patient is oriented to time, place and person. The patient's mood and affect are appropriate. Lymphatic: The lymphatic examination bilaterally reveals all areas to be without enlargement or induration. Neurological: The patient has good coordination and balance. There is no focal weakness or sensory deficit. Left Knee Examination:    Inspection: Normal muscle contours and no significant limb length discrepancy. No gross atrophy in any particular myotome. There is a mild joint effusion of the left knee. There are no abrasions, lacerations, contusions, hematomas or ecchymosis. There is no erythema, induration or warmth to suggest an infectious process. Palpation: Patient has mild tenderness on palpation over the patellar tendon. He also has tenderness on palpation over the mid posterior knee. He denies tenderness on palpation over the medial or lateral joint lines. There is minimal patellofemoral crepitus. Range of Motion:    Flexion: 130°   Extension: 0°    Strength:  Quad 4+ / 5  ; Hamstrings 4+ / 5.   Gross motor to hip and ankle intact    Special Tests:    Lachman (ACL) - negative   Posterior Lachman (PCL) - negative    Anterior Drawer (ACL) - negative   Posterior Drawer (PCL) - negative   Pivot shift (ACL) - negative    Posterior sag (PCL) - negative   Pia's Test (Meniscus) -pain with medial compartment stressing   Homans Test (Thrombophlebitis)- negative   Does not open to valgus or varus stress at 0 or 30° (MCL/LCL)    Skin: There are no rashes, ulcerations or lesions. Sensation to light touch intact. Circulation: The limb is warm and well perfused. Distal pulses intact. Capillary refill is intact. Edema: none. Venous stasis changes: none. Gait: Patient has a antalgic gait and is taking short strides. Radiology:  X-rays obtained today and reviewed in office:  Views: 4 view left knee with comparison AP, flexion PA and skyline views of the right knee  Impression: No evidence for acute fracture or subluxation / dislocation. There are no loose bodies appreciated. There is no evidence of tibiofemoral subluxation. Patient has evidence of previous calcium phosphate injection into both the medial femoral condyle and medial tibial plateau. There are moderate osteoarthritic changes that is worse in the medial compartment. Impression:  Encounter Diagnoses   Name Primary?  Primary osteoarthritis of left knee Yes    Patellar tendinitis of left knee     Tear of medial meniscus of left knee, current, unspecified tear type, subsequent encounter     Acute pain of left knee        Office Procedures:  Orders Placed This Encounter   Procedures    XR KNEE BILATERAL STANDING     Standing Status:   Future     Number of Occurrences:   1     Standing Expiration Date:   5/2/2021    XR KNEE LEFT (3 VIEWS)     Standing Status:   Future     Number of Occurrences:   1     Standing Expiration Date:   5/2/2021    7[address]01 - [place] DRAIN/INJECT LARGE JOINT/BURSA       Injection:  After discussing the risks and benefits of cortisone injection including increased pain, drug reaction, infection, bleeding, blood glucose elevation, lack of improvement and neurovascular injury he agreed to receive one today. Questions were encouraged and answered.  The correct patient, procedure, site and side were identified. The left knee was prepped with Betadine and 2 mL of betamethasone (12mg) mixed with 5 mL 1% lidocaine plain (50mg) were instilled with careful aspiration and injection under aseptic technique. The skin was then cleaned again with alcohol and a sterile adhesive dressing was applied. He tolerated this well and was instructed regarding post-injection care of icing and decreased activity as necessary. Treatment Plan:          Patient presented today with an acute flareup of his left knee pain. He has had no recent injury but has been very active over the last week. He still works for the [employer] and does a lot of walking and standing. He also recently helped his parents move. He has known osteoarthritis of the left knee and has had previous calcium phosphate injection. He likely has chronic meniscal pathology. We discussed conservative treatment options at today's visit and he elected to proceed with cortisone injection. This was completed as recorded above in the procedure note. He is advised to continue resting, icing and elevating the left knee. He will take Tylenol and/or Motrin as needed for pain. He is advised to call the office in 1 week to let me know how he is doing. If pain has not improved we will consider formal physical therapy. Subhanery Dyer was informed of the results of any imaging. We discussed treatment options and a time was given to answer questions. A plan was proposed and Subha Dyer understand and accepts this course of care. Electronically signed by Jeremy Rudd PA-C on 5/5/8194  Board Certified [facility]    Please note that portions of this note were completed with a voice recognition program.  Efforts were made to edit the dictations but occasionally words are mis-transcribed.

## 2021-04-23 ENCOUNTER — TELEPHONE (OUTPATIENT)
Dept: ORTHOPEDIC SURGERY | Age: 41
End: 2021-04-23

## 2021-04-23 DIAGNOSIS — S83.242D TEAR OF MEDIAL MENISCUS OF LEFT KNEE, CURRENT, UNSPECIFIED TEAR TYPE, SUBSEQUENT ENCOUNTER: ICD-10-CM

## 2021-04-23 DIAGNOSIS — M17.12 PRIMARY OSTEOARTHRITIS OF LEFT KNEE: Primary | ICD-10-CM

## 2021-04-23 DIAGNOSIS — M76.52 PATELLAR TENDINITIS OF LEFT KNEE: ICD-10-CM

## 2021-04-23 NOTE — TELEPHONE ENCOUNTER
Spoke to patient and since his appointment he has helped his parents move and do some landscaping and his knee pain has increased. He also worked the [employer] this past Friday and walked a lot of stairs and was barely able to make it to his car due to knee pain. He did say he only had relief from the cortisone injection the day he had it an then his pain returned. He is concerned that he is scheduled to work another [employer] next Friday and would like to know if you recommend he not work.

## 2021-04-23 NOTE — TELEPHONE ENCOUNTER
General Question     Subject: QUESTIONS REGARDING L KNEE  Patient and /or Facility Request: PATIENT  Contact Number: [phone]

## 2021-04-24 NOTE — TELEPHONE ENCOUNTER
Spoke to patient and gave message. He stated he does not need a note to be off work for [employer], he just needs to notify his manager not to schedule him. Order was placed for PT at [place] and he will call to schedule.  He will let us know how is is doing after he has done a couple weeks of PT.

## 2021-05-04 ENCOUNTER — HOSPITAL ENCOUNTER (OUTPATIENT)
Dept: PHYSICAL THERAPY | Age: 41
Setting detail: THERAPIES SERIES
Discharge: HOME | End: 2021-05-04
Payer: COMMERCIAL

## 2021-05-04 PROCEDURE — 97110 THERAPEUTIC EXERCISES: CPT | Performed by: PHYSICAL THERAPIST

## 2021-05-04 PROCEDURE — 97161 PT EVAL LOW COMPLEX 20 MIN: CPT | Performed by: PHYSICAL THERAPIST

## 2021-05-04 NOTE — FLOWSHEET NOTE
[facility]inton Delgado  Phone: [phone]  Fax: [phone]    Physical Therapy Treatment Note/ Progress Report:     Date:  2021    Patient Name:  Neal Levy    :  1980  MRN: [id number]  Restrictions/Precautions:    Medical/Treatment Diagnosis Information:  Diagnosis: M17.12, M 76.52, S83.242   L knee OA, patellar tendinitis, MMT  Treatment Diagnosis: L knee pain and weakness with MM involvement  Insurance/Certification information:  PT Insurance Information: BCBS  80/20%  30V PCY  Physician Information:  Referring Practitioner: KRISTINA Rodriguez  Plan of care signed (Y/N): []  Yes  [x]  No     Date of Patient follow up with Physician:      Progress Report: []  Yes  [x]  No     Date Range for reporting period:  Beginnin21  Ending:     Progress report due (10 Rx/or 30 days whichever is less):    Recertification due (POC duration/ or 90 days whichever is less):      Visit # Insurance Allowable Auth required? Date Range    30 []  Yes  []  No pcy     Latex Allergy:  [x]NO      []YES  Preferred Language for Healthcare:   [x]English       []other:    Functional Scale:       Date assessed:  LEFS: raw score = 28; dysfunction = 65%   21    Pain level: 8 /10     SUBJECTIVE:  Patient had L knee scope in 10, 2019 and was seen for PT until 2020. He continued at the gym until Covid when the gym was shut down. 2021 patient was helping move his parents and has had L knee pain since. She only had 1 day pain relief after cortisone injection last month. MD feels there is dengerative meniscal involvement with OA. Patient was referred to PT at this time.      OBJECTIVE: See eval      RESTRICTIONS/PRECAUTIONS:     Exercises/Interventions:   L knee pain, MMT, OA  Therapeutic Exercise (69926)  Resistance / level Sets/sec Reps Notes / Cues   Bike 1. 4mph  5'    IB   2x30\"    HSS   2x30\" B    Foam:  NBOS EC  HR  Squat  march Therapeutic Activities (76839)       FSU  LSU  Step down       CC: Hip ext, abd  CC: TKE                     Neuromuscular Re-ed (50543)       [facility]                     Manual Intervention (98094)       Patellar mobs                                              Pt. Education:  -pt educated on diagnosis, prognosis and expectations for rehab  -all pt questions were answered    Home Exercise Program:  Access Code: [email or web address]  URL: [email or web address]  Date: 05/04/2021  Prepared by: Gladystine Breaker  Exercises  Supine Straight Leg Raises - 1 x daily - 7 x weekly - 1-2 sets - 10 reps  Straight Leg Raise with External Rotation - 1 x daily - 7 x weekly - 1-2 sets - 10 reps  Supine Quad Set - 1 x daily - 7 x weekly - 1-2 sets - 10 reps - 5 hold  Sidelying Hip Abduction - 1 x daily - 7 x weekly - 2 sets - 10 reps  Standing Hamstring Stretch on Chair - 1 x daily - 7 x weekly - 3 sets - 10 reps  Mini Squat with Counter Support - 1 x daily - 7 x weekly - 1 sets - 10 reps      Therapeutic Exercise and NMR EXR  [x] (14991) Provided verbal/tactile cueing for activities related to strengthening, flexibility, endurance, ROM for improvements in LE, proximal hip, and core control with self care, mobility, lifting, ambulation.  [] (76848) Provided verbal/tactile cueing for activities related to improving balance, coordination, kinesthetic sense, posture, motor skill, proprioception  to assist with LE, proximal hip, and core control in self care, mobility, lifting, ambulation and eccentric single leg control.      NMR and Therapeutic Activities:    [] (13434 or 02703) Provided verbal/tactile cueing for activities related to improving balance, coordination, kinesthetic sense, posture, motor skill, proprioception and motor activation to allow for proper function of core, proximal hip and LE with self care and ADLs  [] (13897) Gait Re-education- Provided training and instruction to the patient for proper LE, core and proximal hip recruitment and positioning and eccentric body weight control with ambulation re-education including up and down stairs     Home Exercise Program:    [x] (11872) Reviewed/Progressed HEP activities related to strengthening, flexibility, endurance, ROM of core, proximal hip and LE for functional self-care, mobility, lifting and ambulation/stair navigation   [] (27923)Reviewed/Progressed HEP activities related to improving balance, coordination, kinesthetic sense, posture, motor skill, proprioception of core, proximal hip and LE for self care, mobility, lifting, and ambulation/stair navigation      Manual Treatments:  PROM / STM / Oscillations-Mobs:  G-I, II, III, IV (PA's, Inf., Post.)  [] (62565) Provided manual therapy to mobilize LE, proximal hip and/or LS spine soft tissue/joints for the purpose of modulating pain, promoting relaxation,  increasing ROM, reducing/eliminating soft tissue swelling/inflammation/restriction, improving soft tissue extensibility and allowing for proper ROM for normal function with self care, mobility, lifting and ambulation. Modalities:  [] (07842) Vasopneumatic compression: Utilized vasopneumatic compression to decrease edema / swelling for the purpose of improving mobility and quad tone / recruitment which will allow for increased overall function including but not limited to self-care, transfers, ambulation, and ascending / descending stairs.        Modalities:  Ice at home    Charges:  Timed Code Treatment Minutes: 30   Total Treatment Minutes: 50     [x] EVAL - LOW (18775)   [] EVAL - MOD (49402)  [] EVAL - HIGH (01828)  [] RE-EVAL (63691)  [x] TE (22618) x2      [] Ionto (58174)  [] NMR (33465) x      [] Vaso (98424)  [] Manual (72008) x      [] Ultrasound  [] TA (56374) x      [] Mech Traction (94295)  [] Gait Training x     [] ES (un) (50585):   [] Aquatic therapy x   [] Other:   [] Group:     GOALS:   Patient stated goal: decreased pain, return to prior ADL level  []? Progressing: []? Met: []? Not Met: []? Adjusted     Therapist goals for Patient:   Short Term Goals: To be achieved in: 2 weeks  1. Independent in HEP and progression per patient tolerance, in order to prevent re-injury. []? Progressing: []? Met: []? Not Met: []? Adjusted  2. Patient will have a decrease in pain to facilitate improvement in movement, function, and ADLs as indicated by Functional Deficits. []? Progressing: []? Met: []? Not Met: []? Adjusted     Long Term Goals: To be achieved in: 6 weeks  1. Disability index score of 30% or less for the LEFS to assist with reaching prior level of function. []? Progressing: []? Met: []? Not Met: []? Adjusted  2. Patient will demonstrate increased AROM to 0-130 to allow for proper joint functioning as indicated by patients Functional Deficits. []? Progressing: []? Met: []? Not Met: []? Adjusted  3. Patient will demonstrate an increase in Strength to at least 4+/5  as well as good proximal hip strength and control to allow for proper functional mobility as indicated by patients Functional Deficits. []? Progressing: []? Met: []? Not Met: []? Adjusted  4. Patient will return to functional activities including ease with stair ambulation and prolonged walking for his job and exercise without increased symptoms or restriction. []? Progressing: []? Met: []? Not Met: []? Adjusted  5. Patient will report 70% improvement in pain and function  []? Progressing: []? Met: []? Not Met: []? Adjusted            Overall Progression Towards Functional goals/ Treatment Progress Update:  [] Patient is progressing as expected towards functional goals listed. [] Progression is slowed due to complexities/Impairments listed. [] Progression has been slowed due to co-morbidities.   [x] Plan just implemented, too soon to assess goals progression <30days   [] Goals require adjustment due to lack of progress  [] Patient is not progressing as expected and requires additional follow up with physician  [] Other    Persisting Functional Limitations/Impairments:  []Sitting [x]Standing   [x]Walking [x]Stairs   [x]Transfers [x]ADLs   [x]Squatting/bending [x]Kneeling  [x]Housework [x]Job related tasks  []Driving []Sports/Recreation   []Sleeping []Other:    ASSESSMENT:  See antonio  Treatment/Activity Tolerance:  [x] Pt able to complete treatment [] Patient limited by fatique  [] Patient limited by pain  [] Patient limited by other medical complications  [] Other:     Prognosis:  [x] Good [] Fair  [] Poor    Patient Requires Follow-up: [x] Yes  [] No    Return to Play:    [x]  N/A   []  Stage 1: Intro to Strength   []  Stage 2: Return to Run and Strength   []  Stage 3: Return to Jump and Strength   []  Stage 4: Dynamic Strength and Agility   []  Stage 5: Sport Specific Training     []  Ready to Return to Play, Meets All Above Stages   []  Not Ready for Return to Sports   Comments:            Plan for next treatment session: progress quad strengthening    PLAN: See antonio. PT 2x / week for 6 weeks.    [] Continue per plan of care [] Alter current plan (see comments)  [x] Plan of care initiated [] Hold pending MD visit [] Discharge    Electronically signed by: Krissy Corral  MPT [id number]

## 2021-05-11 ENCOUNTER — HOSPITAL ENCOUNTER (OUTPATIENT)
Dept: PHYSICAL THERAPY | Age: 41
Setting detail: THERAPIES SERIES
Discharge: HOME | End: 2021-05-11
Payer: COMMERCIAL

## 2021-05-11 PROCEDURE — 97110 THERAPEUTIC EXERCISES: CPT | Performed by: PHYSICAL THERAPIST

## 2021-05-11 PROCEDURE — 97530 THERAPEUTIC ACTIVITIES: CPT | Performed by: PHYSICAL THERAPIST

## 2021-05-11 NOTE — FLOWSHEET NOTE
[facility]  Phone: [phone]  Fax: [phone]    Physical Therapy Treatment Note/ Progress Report:     Date:  2021    Patient Name:  Christopher Torres    :  1980  MRN: [id number]  Restrictions/Precautions:    Medical/Treatment Diagnosis Information:  Diagnosis: M17.12, M 76.52, S83.242   L knee OA, patellar tendinitis, MMT  Treatment Diagnosis: L knee pain and weakness with MM involvement  Insurance/Certification information:  PT Insurance Information: BCBS  80/20%  30V PCY  Physician Information:  Referring Practitioner: KRISTINA Cotto  Plan of care signed (Y/N): []  Yes  [x]  No     Date of Patient follow up with Physician:      Progress Report: []  Yes  [x]  No     Date Range for reporting period:  Beginnin21  Ending:     Progress report due (10 Rx/or 30 days whichever is less):    Recertification due (POC duration/ or 90 days whichever is less):      Visit # Insurance Allowable Auth required? Date Range   2 30 []  Yes  []  No pcy     Latex Allergy:  [x]NO      []YES  Preferred Language for Healthcare:   [x]English       []other:    Functional Scale:       Date assessed:  LEFS: raw score = 28; dysfunction = 65%   21    Pain level: 8 /10     History:  Patient had L knee scope in 10, 2019 and was seen for PT until 2020. He continued at the gym until Covid when the gym was shut down. 2021 patient was helping move his parents and has had L knee pain since. She only had 1 day pain relief after cortisone injection last month. MD feels there is dengerative meniscal involvement with OA. Patient was referred to PT at this time.     SUBJECTIVE:     OBJECTIVE: See eval      RESTRICTIONS/PRECAUTIONS:     Exercises/Interventions:   L knee pain, MMT, OA  Therapeutic Exercise (56250)  Resistance / level Sets/sec Reps Notes / Cues   Bike  #10 1. 7mph  6'    IB   2x30\"    HSS   2x30\" B    Foam:  NBOS EC  HR  Squat  march 30\"  10                                  Mat Ex:  SLR  SLR  w ER  QS  Sl hip abd  Bridge with add set  SAQ       10  2x10 L HEP          Therapeutic Activities (77332)       FSU  LSU  Step down       CC: Hip ext, abd  CC: TKE 5#  15#  10 ea L/R  10x5\"    Rockerboard  Balance  F/B     30\"  10x           Neuromuscular Re-ed (36787)       [facility]                     Manual Intervention (33220)       Patellar mobs   3'    k-tape for improved patellar alignment   add                                    Pt. Education:  -pt educated on diagnosis, prognosis and expectations for rehab  -all pt questions were answered    Home Exercise Program:  Access Code: [email or web address]  URL: [email or web address]  Date: 05/04/2021  Prepared by: Vasu Carrillo  Exercises  Supine Straight Leg Raises - 1 x daily - 7 x weekly - 1-2 sets - 10 reps  Straight Leg Raise with External Rotation - 1 x daily - 7 x weekly - 1-2 sets - 10 reps  Supine Quad Set - 1 x daily - 7 x weekly - 1-2 sets - 10 reps - 5 hold  Sidelying Hip Abduction - 1 x daily - 7 x weekly - 2 sets - 10 reps  Standing Hamstring Stretch on Chair - 1 x daily - 7 x weekly - 3 sets - 10 reps  Mini Squat with Counter Support - 1 x daily - 7 x weekly - 1 sets - 10 reps      Therapeutic Exercise and NMR EXR  [x] (82707) Provided verbal/tactile cueing for activities related to strengthening, flexibility, endurance, ROM for improvements in LE, proximal hip, and core control with self care, mobility, lifting, ambulation.  [] (86447) Provided verbal/tactile cueing for activities related to improving balance, coordination, kinesthetic sense, posture, motor skill, proprioception  to assist with LE, proximal hip, and core control in self care, mobility, lifting, ambulation and eccentric single leg control.      NMR and Therapeutic Activities:    [x] (78196 or 55029) Provided verbal/tactile cueing for activities related to improving balance, coordination, kinesthetic sense, posture, motor skill, proprioception and motor activation to allow for proper function of core, proximal hip and LE with self care and ADLs  [] (62540) Gait Re-education- Provided training and instruction to the patient for proper LE, core and proximal hip recruitment and positioning and eccentric body weight control with ambulation re-education including up and down stairs     Home Exercise Program:    [x] (28053) Reviewed/Progressed HEP activities related to strengthening, flexibility, endurance, ROM of core, proximal hip and LE for functional self-care, mobility, lifting and ambulation/stair navigation   [] (54695)Reviewed/Progressed HEP activities related to improving balance, coordination, kinesthetic sense, posture, motor skill, proprioception of core, proximal hip and LE for self care, mobility, lifting, and ambulation/stair navigation      Manual Treatments:  PROM / STM / Oscillations-Mobs:  G-I, II, III, IV (PA's, Inf., Post.)  [] (97952) Provided manual therapy to mobilize LE, proximal hip and/or LS spine soft tissue/joints for the purpose of modulating pain, promoting relaxation,  increasing ROM, reducing/eliminating soft tissue swelling/inflammation/restriction, improving soft tissue extensibility and allowing for proper ROM for normal function with self care, mobility, lifting and ambulation. Modalities:  [] (13382) Vasopneumatic compression: Utilized vasopneumatic compression to decrease edema / swelling for the purpose of improving mobility and quad tone / recruitment which will allow for increased overall function including but not limited to self-care, transfers, ambulation, and ascending / descending stairs.        Modalities:  5/11: L knee pain x 10 min    Charges:  Timed Code Treatment Minutes: 40   Total Treatment Minutes: 50     [] EVAL - LOW (41974)   [] EVAL - MOD (94400)  [] EVAL - HIGH (99400)  [] RE-EVAL (21774)  [x] TE (50580) x2      [] Ionto (94845)  [] NMR (19304) x      [] Vaso (51254)  [] Manual ([id number]) x      [] Ultrasound  [x] TA (56912) x1      [] Mech Traction (86361)  [] Gait Training x     [] ES (un) (61435):   [] Aquatic therapy x   [] Other:   [] Group:     GOALS:   Patient stated goal: decreased pain, return to prior ADL level  [x]? Progressing: []? Met: []? Not Met: []? Adjusted     Therapist goals for Patient:   Short Term Goals: To be achieved in: 2 weeks  1. Independent in HEP and progression per patient tolerance, in order to prevent re-injury. [x]? Progressing: []? Met: []? Not Met: []? Adjusted  2. Patient will have a decrease in pain to facilitate improvement in movement, function, and ADLs as indicated by Functional Deficits. [x]? Progressing: []? Met: []? Not Met: []? Adjusted     Long Term Goals: To be achieved in: 6 weeks  1. Disability index score of 30% or less for the LEFS to assist with reaching prior level of function. [x]? Progressing: []? Met: []? Not Met: []? Adjusted  2. Patient will demonstrate increased AROM to 0-130 to allow for proper joint functioning as indicated by patients Functional Deficits. [x]? Progressing: []? Met: []? Not Met: []? Adjusted  3. Patient will demonstrate an increase in Strength to at least 4+/5  as well as good proximal hip strength and control to allow for proper functional mobility as indicated by patients Functional Deficits. [x]? Progressing: []? Met: []? Not Met: []? Adjusted  4. Patient will return to functional activities including ease with stair ambulation and prolonged walking for his job and exercise without increased symptoms or restriction. [x]? Progressing: []? Met: []? Not Met: []? Adjusted  5. Patient will report 70% improvement in pain and function  [x]? Progressing: []? Met: []? Not Met: []? Adjusted            Overall Progression Towards Functional goals/ Treatment Progress Update:  [] Patient is progressing as expected towards functional goals listed.     [] Progression is slowed due to complexities/Impairments

## 2021-05-12 ENCOUNTER — HOSPITAL ENCOUNTER (OUTPATIENT)
Dept: PHYSICAL THERAPY | Age: 41
Setting detail: THERAPIES SERIES
Discharge: HOME | End: 2021-05-12
Payer: COMMERCIAL

## 2021-05-12 PROCEDURE — 97110 THERAPEUTIC EXERCISES: CPT

## 2021-05-12 PROCEDURE — 97530 THERAPEUTIC ACTIVITIES: CPT

## 2021-05-12 NOTE — FLOWSHEET NOTE
[address]  Phone: [phone]  Fax: [phone]    Physical Therapy Treatment Note/ Progress Report:     Date:  2021    Patient Name:  Joaquin Erazo    :  1980  MRN: [id number]  Restrictions/Precautions:    Medical/Treatment Diagnosis Information:  Diagnosis: M17.12, M 76.52, S83.242   L knee OA, patellar tendinitis, MMT  Treatment Diagnosis: L knee pain and weakness with MM involvement  Insurance/Certification information:  PT Insurance Information: BCBS  80/20%  30V PCY  Physician Information:  Referring Practitioner: KRISTINA Garcia  Plan of care signed (Y/N): []  Yes  [x]  No     Date of Patient follow up with Physician:      Progress Report: []  Yes  [x]  No     Date Range for reporting period:  Beginnin21  Ending:     Progress report due (10 Rx/or 30 days whichever is less):    Recertification due (POC duration/ or 90 days whichever is less):      Visit # Insurance Allowable Auth required? Date Range   3 30 []  Yes  []  No pcy     Latex Allergy:  [x]NO      []YES  Preferred Language for Healthcare:   [x]English       []other:    Functional Scale:       Date assessed:  LEFS: raw score = 28; dysfunction = 65%   21    Pain level: 8/10     History:  Patient had L knee scope in 10, 2019 and was seen for PT until 2020. He continued at the gym until Covid when the gym was shut down. 2021 patient was helping move his parents and has had L knee pain since. She only had 1 day pain relief after cortisone injection last month. MD feels there is dengerative meniscal involvement with OA. Patient was referred to PT at this time.     SUBJECTIVE: Pt reports that he is doing pretty well, was painful after PT yesterday and is a little concerned about how painful he will be tomorrow.      OBJECTIVE: See eval      RESTRICTIONS/PRECAUTIONS:     Exercises/Interventions:   L knee pain, MMT, OA  Therapeutic Exercise (43603) Resistance / level Sets/sec Reps Notes / Cues   Bike  #10 1.8 mph  8' Lost track of time   IB   2x30\"    HSS   2x30\" B    Foam:  NBOS EC  HR  Squat  March  tandem     30\"  10  10  20                                Mat Ex:  SLR  SLR  w ER  QS  Sl hip abd  Bridge with add set  SAQ     10 L  10  2x10 L HEP          Therapeutic Activities (06782)       FSU  LSU  Step down 4\"  4\"  10 L  10 L    CC: Hip ext, abd  CC: TKE 5#  15#  10 ea L/R  10x5\"    Rockerboard  Balance  F/B     30\"  10x           Neuromuscular Re-ed (95201)       [facility]                     Manual Intervention (79679)       Patellar mobs   3'    k-tape for improved patellar alignment                                       Pt. Education:  -pt educated on diagnosis, prognosis and expectations for rehab  -all pt questions were answered    Home Exercise Program:  Access Code: [email or web address]  URL: [email or web address]  Date: 05/04/2021  Prepared by: Heidi Steiner  Exercises  Supine Straight Leg Raises - 1 x daily - 7 x weekly - 1-2 sets - 10 reps  Straight Leg Raise with External Rotation - 1 x daily - 7 x weekly - 1-2 sets - 10 reps  Supine Quad Set - 1 x daily - 7 x weekly - 1-2 sets - 10 reps - 5 hold  Sidelying Hip Abduction - 1 x daily - 7 x weekly - 2 sets - 10 reps  Standing Hamstring Stretch on Chair - 1 x daily - 7 x weekly - 3 sets - 10 reps  Mini Squat with Counter Support - 1 x daily - 7 x weekly - 1 sets - 10 reps      Therapeutic Exercise and NMR EXR  [x] (02375) Provided verbal/tactile cueing for activities related to strengthening, flexibility, endurance, ROM for improvements in LE, proximal hip, and core control with self care, mobility, lifting, ambulation.  [] (12430) Provided verbal/tactile cueing for activities related to improving balance, coordination, kinesthetic sense, posture, motor skill, proprioception  to assist with LE, proximal hip, and core control in self care, mobility, lifting, ambulation and eccentric single leg control. NMR and Therapeutic Activities:    [x] (48805 or 93374) Provided verbal/tactile cueing for activities related to improving balance, coordination, kinesthetic sense, posture, motor skill, proprioception and motor activation to allow for proper function of core, proximal hip and LE with self care and ADLs  [] (02591) Gait Re-education- Provided training and instruction to the patient for proper LE, core and proximal hip recruitment and positioning and eccentric body weight control with ambulation re-education including up and down stairs     Home Exercise Program:    [x] (45921) Reviewed/Progressed HEP activities related to strengthening, flexibility, endurance, ROM of core, proximal hip and LE for functional self-care, mobility, lifting and ambulation/stair navigation   [] (39839)Reviewed/Progressed HEP activities related to improving balance, coordination, kinesthetic sense, posture, motor skill, proprioception of core, proximal hip and LE for self care, mobility, lifting, and ambulation/stair navigation      Manual Treatments:  PROM / STM / Oscillations-Mobs:  G-I, II, III, IV (PA's, Inf., Post.)  [] (15290) Provided manual therapy to mobilize LE, proximal hip and/or LS spine soft tissue/joints for the purpose of modulating pain, promoting relaxation,  increasing ROM, reducing/eliminating soft tissue swelling/inflammation/restriction, improving soft tissue extensibility and allowing for proper ROM for normal function with self care, mobility, lifting and ambulation. Modalities:  [] (46314) Vasopneumatic compression: Utilized vasopneumatic compression to decrease edema / swelling for the purpose of improving mobility and quad tone / recruitment which will allow for increased overall function including but not limited to self-care, transfers, ambulation, and ascending / descending stairs.        Modalities:  5/12: CP to L knee on foam roll x 10' - after k tape applied    Charges:  Timed Code Treatment Minutes:      Overall Progression Towards Functional goals/ Treatment Progress Update:  [] Patient is progressing as expected towards functional goals listed. [] Progression is slowed due to complexities/Impairments listed. [] Progression has been slowed due to co-morbidities. [x] Plan just implemented, too soon to assess goals progression <30days   [] Goals require adjustment due to lack of progress  [] Patient is not progressing as expected and requires additional follow up with physician  [] Other    Persisting Functional Limitations/Impairments:  []Sitting [x]Standing   [x]Walking [x]Stairs   [x]Transfers [x]ADLs   [x]Squatting/bending [x]Kneeling  [x]Housework [x]Job related tasks  []Driving []Sports/Recreation   []Sleeping []Other:    ASSESSMENT: Pt had fair exercise tolerance and reported no increase in pain. Added in [address] and [employer]; pt did well with form but was very cautious in moving his knee. Challenged by CC hip strengthening but noted that TKE felt good. Pt demonstrated quad fatigue with SAQ. Trial of k tape to help patellar alignment and provide increased knee support. Treatment/Activity Tolerance:  [x] Pt able to complete treatment [] Patient limited by fatique  [] Patient limited by pain  [] Patient limited by other medical complications  [] Other:     Prognosis:  [x] Good [] Fair  [] Poor    Patient Requires Follow-up: [x] Yes  [] No    Return to Play:    [x]  N/A   []  Stage 1: Intro to Strength   []  Stage 2: Return to Run and Strength   []  Stage 3: Return to Jump and Strength   []  Stage 4: Dynamic Strength and Agility   []  Stage 5: Sport Specific Training     []  Ready to Return to Play, Meets All Above Stages   []  Not Ready for Return to Sports   Comments:            Plan for next treatment session: progress quad strengthening    PLAN: See magan PT 2x / week for 6 weeks.    [x] Continue per plan of care [] Alter current plan (see comments)  [] Plan of care initiated [] Hold pending MD visit [] Discharge    Electronically signed by: Isidro Settler  [id number]

## 2021-05-19 ENCOUNTER — HOSPITAL ENCOUNTER (OUTPATIENT)
Dept: PHYSICAL THERAPY | Age: 41
Setting detail: THERAPIES SERIES
Discharge: HOME | End: 2021-05-19
Payer: COMMERCIAL

## 2021-05-19 PROCEDURE — 97530 THERAPEUTIC ACTIVITIES: CPT

## 2021-05-19 PROCEDURE — 97110 THERAPEUTIC EXERCISES: CPT

## 2021-05-19 NOTE — FLOWSHEET NOTE
Quinton Delgado  Phone: [phone]  Fax: [phone]    Physical Therapy Treatment Note/ Progress Report:     Date:  2021    Patient Name:  Juan F Puga    :  1980  MRN: [id number]  Restrictions/Precautions:    Medical/Treatment Diagnosis Information:  Diagnosis: M17.12, M 76.52, S83.242   L knee OA, patellar tendinitis, MMT  Treatment Diagnosis: L knee pain and weakness with MM involvement  Insurance/Certification information:  PT Insurance Information: BCBS  80/20%  30V PCY  Physician Information:  Referring Practitioner: KRISTINA Barcenas  Plan of care signed (Y/N): []  Yes  [x]  No     Date of Patient follow up with Physician:      Progress Report: []  Yes  [x]  No     Date Range for reporting period:  Beginnin21  Ending:     Progress report due (10 Rx/or 30 days whichever is less):    Recertification due (POC duration/ or 90 days whichever is less):      Visit # Insurance Allowable Auth required? Date Range   4 30 []  Yes  []  No pcy     Latex Allergy:  [x]NO      []YES  Preferred Language for Healthcare:   [x]English       []other:    Functional Scale:       Date assessed:  LEFS: raw score = 28; dysfunction = 65%   21    Pain level: 8/10     History:  Patient had L knee scope in 10, 2019 and was seen for PT until 2020. He continued at the gym until Covid when the gym was shut down. 2021 patient was helping move his parents and has had L knee pain since. She only had 1 day pain relief after cortisone injection last month. MD feels there is dengerative meniscal involvement with OA. Patient was referred to PT at this time.     SUBJECTIVE: Pt reports that he went to the [employer] game on Monday and was pretty painful walking but felt it more the following day.  Thinks k tape is helpful and ordered some to use at home    OBJECTIVE: See eval      RESTRICTIONS/PRECAUTIONS:     Exercises/Interventions:   L knee pain, MMT, OA  Therapeutic Exercise (76290)  Resistance / level Sets/sec Reps Notes / Cues   Bike  #10 2 mph  5'    IB   2x30\"    HSS   2x30\" B    Foam:  NBOS EC  HR  Squat  March  tandem            20\"   30\"  10  10  20  2           TG Squats   20 Cueing to not hyperextend knees/slap into ext   tband side steps              Mat Ex:  LAQ  SLR  SLR  w ER  QS  Sl hip abd  Bridge with add set  SAQ   2#    15 L  10 L10 L10 L  10  2x10 L     HEP          Add weight npv          Therapeutic Activities (63754)       FSU  LSU  Step down 6\"  6\"  6\"  10 L  10 L  10 L    CC: Hip ext, abd  CC: TKE 5#  15#  10 ea L/R  10x5\"    Rockerboard  Balance  F/B     30\"  10x           Neuromuscular Re-ed (83149)       [facility]                     Manual Intervention (59104)       Patellar mobs   3'    k-tape for improved patellar alignment                                       Pt. Education:  -pt educated on diagnosis, prognosis and expectations for rehab  -all pt questions were answered    Home Exercise Program:  Access Code: [email or web address]  URL: [email or web address]/  Date: 05/04/2021  Prepared by: Kelsea Haynes  Exercises  Supine Straight Leg Raises - 1 x daily - 7 x weekly - 1-2 sets - 10 reps  Straight Leg Raise with External Rotation - 1 x daily - 7 x weekly - 1-2 sets - 10 reps  Supine Quad Set - 1 x daily - 7 x weekly - 1-2 sets - 10 reps - 5 hold  Sidelying Hip Abduction - 1 x daily - 7 x weekly - 2 sets - 10 reps  Standing Hamstring Stretch on Chair - 1 x daily - 7 x weekly - 3 sets - 10 reps  Mini Squat with Counter Support - 1 x daily - 7 x weekly - 1 sets - 10 reps      Therapeutic Exercise and NMR EXR  [x] (35258) Provided verbal/tactile cueing for activities related to strengthening, flexibility, endurance, ROM for improvements in LE, proximal hip, and core control with self care, mobility, lifting, ambulation.  [] (00836) Provided verbal/tactile cueing for activities related to improving balance, coordination, kinesthetic sense, posture, motor skill, proprioception  to assist with LE, proximal hip, and core control in self care, mobility, lifting, ambulation and eccentric single leg control. NMR and Therapeutic Activities:    [x] (04585 or 50952) Provided verbal/tactile cueing for activities related to improving balance, coordination, kinesthetic sense, posture, motor skill, proprioception and motor activation to allow for proper function of core, proximal hip and LE with self care and ADLs  [] (04738) Gait Re-education- Provided training and instruction to the patient for proper LE, core and proximal hip recruitment and positioning and eccentric body weight control with ambulation re-education including up and down stairs     Home Exercise Program:    [x] (73380) Reviewed/Progressed HEP activities related to strengthening, flexibility, endurance, ROM of core, proximal hip and LE for functional self-care, mobility, lifting and ambulation/stair navigation   [] (70141)Reviewed/Progressed HEP activities related to improving balance, coordination, kinesthetic sense, posture, motor skill, proprioception of core, proximal hip and LE for self care, mobility, lifting, and ambulation/stair navigation      Manual Treatments:  PROM / STM / Oscillations-Mobs:  G-I, II, III, IV (PA's, Inf., Post.)  [] (27968) Provided manual therapy to mobilize LE, proximal hip and/or LS spine soft tissue/joints for the purpose of modulating pain, promoting relaxation,  increasing ROM, reducing/eliminating soft tissue swelling/inflammation/restriction, improving soft tissue extensibility and allowing for proper ROM for normal function with self care, mobility, lifting and ambulation.      Modalities:  [] (68510) Vasopneumatic compression: Utilized vasopneumatic compression to decrease edema / swelling for the purpose of improving mobility and quad tone / recruitment which will allow for increased overall function including but not limited to self-care, transfers, ambulation, and ascending / descending stairs. Modalities:  5/19: CP to L knee on foam roll x 10' - after k tape applied    Charges:  Timed Code Treatment Minutes: 43   Total Treatment Minutes: 53     [] EVAL - LOW (98288)   [] EVAL - MOD (09372)  [] EVAL - HIGH (61511)  [] RE-EVAL (21541)  [x] TE (52857) x2      [] Ionto (06979)  [] NMR (77223) x      [] Vaso (18911)  [] Manual (40481) x      [] Ultrasound  [x] TA (31721) x1      [] Mech Traction (61032)  [] Gait Training x     [] ES (un) (15012):   [] Aquatic therapy x   [] Other:   [] Group:     GOALS:   Patient stated goal: decreased pain, return to prior ADL level  [x]? Progressing: []? Met: []? Not Met: []? Adjusted     Therapist goals for Patient:   Short Term Goals: To be achieved in: 2 weeks  1. Independent in HEP and progression per patient tolerance, in order to prevent re-injury. [x]? Progressing: []? Met: []? Not Met: []? Adjusted  2. Patient will have a decrease in pain to facilitate improvement in movement, function, and ADLs as indicated by Functional Deficits. [x]? Progressing: []? Met: []? Not Met: []? Adjusted     Long Term Goals: To be achieved in: 6 weeks  1. Disability index score of 30% or less for the LEFS to assist with reaching prior level of function. [x]? Progressing: []? Met: []? Not Met: []? Adjusted  2. Patient will demonstrate increased AROM to 0-130 to allow for proper joint functioning as indicated by patients Functional Deficits. [x]? Progressing: []? Met: []? Not Met: []? Adjusted  3. Patient will demonstrate an increase in Strength to at least 4+/5  as well as good proximal hip strength and control to allow for proper functional mobility as indicated by patients Functional Deficits. [x]? Progressing: []? Met: []? Not Met: []? Adjusted  4. Patient will return to functional activities including ease with stair ambulation and prolonged walking for his job and exercise without increased symptoms or restriction. [x]? Progressing: []? Met: []? Not Met: []? Adjusted  5. Patient will report 70% improvement in pain and function  [x]? Progressing: []? Met: []? Not Met: []? Adjusted            Overall Progression Towards Functional goals/ Treatment Progress Update:  [] Patient is progressing as expected towards functional goals listed. [] Progression is slowed due to complexities/Impairments listed. [] Progression has been slowed due to co-morbidities. [x] Plan just implemented, too soon to assess goals progression <30days   [] Goals require adjustment due to lack of progress  [] Patient is not progressing as expected and requires additional follow up with physician  [] Other    Persisting Functional Limitations/Impairments:  []Sitting [x]Standing   [x]Walking [x]Stairs   [x]Transfers [x]ADLs   [x]Squatting/bending [x]Kneeling  [x]Housework [x]Job related tasks  []Driving []Sports/Recreation   []Sleeping []Other:    ASSESSMENT: Pt had fair exercise tolerance and reported no increase in pain. Did well with FSU and [employer] on 6\" step, however noted more discomfort with [employer], no issues with step downs. Quad fatigued at end of session, especially after SLR's. Continued with k tape and ice at end of session. Treatment/Activity Tolerance:  [x] Pt able to complete treatment [] Patient limited by fatique  [] Patient limited by pain  [] Patient limited by other medical complications  [] Other:     Prognosis:  [x] Good [] Fair  [] Poor    Patient Requires Follow-up: [x] Yes  [] No    Return to Play:    [x]  N/A   []  Stage 1: Intro to Strength   []  Stage 2: Return to Run and Strength   []  Stage 3: Return to Jump and Strength   []  Stage 4: Dynamic Strength and Agility   []  Stage 5: Sport Specific Training     []  Ready to Return to Play, Meets All Above Stages   []  Not Ready for Return to Sports   Comments:            Plan for next treatment session: progress quad strengthening    PLAN: See antonio. PT 2x / week for 6 weeks.    [x] Continue per plan of care [] Alter current plan (see comments)  [] Plan of care initiated [] Hold pending MD visit [] Discharge    Electronically signed by: Belén Hendricks PTA  [id number]

## 2021-05-20 ENCOUNTER — HOSPITAL ENCOUNTER (OUTPATIENT)
Dept: PHYSICAL THERAPY | Age: 41
Setting detail: THERAPIES SERIES
Discharge: HOME | End: 2021-05-20
Payer: COMMERCIAL

## 2021-05-20 PROCEDURE — 97530 THERAPEUTIC ACTIVITIES: CPT

## 2021-05-20 PROCEDURE — 97110 THERAPEUTIC EXERCISES: CPT

## 2021-05-20 NOTE — FLOWSHEET NOTE
Reps Notes / Quintana Edward  #10 2 mph  5'    IB   2x30\"    HSS   2x30\" B    Foam:  NBOS EC  HR  Squat  March  tandem            20\"   30\"  10  10  20  2           TG Squats with ball squeeze   20    tband side steps              Mat Ex:  LAQ  SLR  SLR  w ER  QS  Sl hip abd  Bridge with add set  SAQ   2#    15 L  10 L  10 L10 L  15     HEP          Add weight npv          Therapeutic Activities (95767)       FSU  LSU  Step down 6\"  6\"  6\"  10 L  10 L  10 L    CC: Hip ext, abd  CC: TKE 5#  17.5#  10 ea L/R  10x5\"    Rockerboard  Balance  F/B, s/s     30\"  15x           Neuromuscular Re-ed (28657)       [facility]                     Manual Intervention (45052)       Patellar mobs       k-tape for improved patellar alignment       Roller STM to L hamstring/calf   5'                             Pt. Education:  -pt educated on diagnosis, prognosis and expectations for rehab  -all pt questions were answered    Home Exercise Program:  Access Code: [email or web address]  URL: [email or web address]  Date: 05/04/2021  Prepared by: Jolanta Madison  Exercises  Supine Straight Leg Raises - 1 x daily - 7 x weekly - 1-2 sets - 10 reps  Straight Leg Raise with External Rotation - 1 x daily - 7 x weekly - 1-2 sets - 10 reps  Supine Quad Set - 1 x daily - 7 x weekly - 1-2 sets - 10 reps - 5 hold  Sidelying Hip Abduction - 1 x daily - 7 x weekly - 2 sets - 10 reps  Standing Hamstring Stretch on Chair - 1 x daily - 7 x weekly - 3 sets - 10 reps  Mini Squat with Counter Support - 1 x daily - 7 x weekly - 1 sets - 10 reps      Therapeutic Exercise and NMR EXR  [x] (46181) Provided verbal/tactile cueing for activities related to strengthening, flexibility, endurance, ROM for improvements in LE, proximal hip, and core control with self care, mobility, lifting, ambulation.  [] (43434) Provided verbal/tactile cueing for activities related to improving balance, coordination, kinesthetic sense, posture, motor skill, proprioception  to assist with LE, Modalities:  5/20: CP to L knee on foam roll x 10'    Charges:  Timed Code Treatment Minutes: 50   Total Treatment Minutes: 60     [] EVAL - LOW (72536)   [] EVAL - MOD (28963)  [] EVAL - HIGH (78044)  [] RE-EVAL (95371)  [x] TE (88049) x2      [] Ionto (82414)  [] NMR (03269) x      [] Vaso (71572)  [] Manual (14038) x      [] Ultrasound  [x] TA (81203) x1      [] Mech Traction (03464)  [] Gait Training x     [] ES (un) (87282):   [] Aquatic therapy x   [] Other:   [] Group:     GOALS:   Patient stated goal: decreased pain, return to prior ADL level  [x]? Progressing: []? Met: []? Not Met: []? Adjusted     Therapist goals for Patient:   Short Term Goals: To be achieved in: 2 weeks  1. Independent in HEP and progression per patient tolerance, in order to prevent re-injury. [x]? Progressing: []? Met: []? Not Met: []? Adjusted  2. Patient will have a decrease in pain to facilitate improvement in movement, function, and ADLs as indicated by Functional Deficits. [x]? Progressing: []? Met: []? Not Met: []? Adjusted     Long Term Goals: To be achieved in: 6 weeks  1. Disability index score of 30% or less for the LEFS to assist with reaching prior level of function. [x]? Progressing: []? Met: []? Not Met: []? Adjusted  2. Patient will demonstrate increased AROM to 0-130 to allow for proper joint functioning as indicated by patients Functional Deficits. [x]? Progressing: []? Met: []? Not Met: []? Adjusted  3. Patient will demonstrate an increase in Strength to at least 4+/5  as well as good proximal hip strength and control to allow for proper functional mobility as indicated by patients Functional Deficits. [x]? Progressing: []? Met: []? Not Met: []? Adjusted  4. Patient will return to functional activities including ease with stair ambulation and prolonged walking for his job and exercise without increased symptoms or restriction. [x]? Progressing: []? Met: []? Not Met: []? Adjusted  5.  Patient will report 70% improvement in pain and function  [x]? Progressing: []? Met: []? Not Met: []? Adjusted            Overall Progression Towards Functional goals/ Treatment Progress Update:  [] Patient is progressing as expected towards functional goals listed. [] Progression is slowed due to complexities/Impairments listed. [] Progression has been slowed due to co-morbidities. [x] Plan just implemented, too soon to assess goals progression <30days   [] Goals require adjustment due to lack of progress  [] Patient is not progressing as expected and requires additional follow up with physician  [] Other    Persisting Functional Limitations/Impairments:  []Sitting [x]Standing   [x]Walking [x]Stairs   [x]Transfers [x]ADLs   [x]Squatting/bending [x]Kneeling  [x]Housework [x]Job related tasks  []Driving []Sports/Recreation   []Sleeping []Other:    ASSESSMENT: Pt had pretty good exercise tolerance, able to continue increasing reps/resistance without issue. Quad fatigue with LAQ. Pt was a little sore in L hamstring after session yesterday so added roller STM at end of this session to reduce soreness. Pt kept k tape on from yesterday so did not apply k tape today but continued with ice. Treatment/Activity Tolerance:  [x] Pt able to complete treatment [] Patient limited by fatique  [] Patient limited by pain  [] Patient limited by other medical complications  [] Other:     Prognosis:  [x] Good [] Fair  [] Poor    Patient Requires Follow-up: [x] Yes  [] No    Return to Play:    [x]  N/A   []  Stage 1: Intro to Strength   []  Stage 2: Return to Run and Strength   []  Stage 3: Return to Jump and Strength   []  Stage 4: Dynamic Strength and Agility   []  Stage 5: Sport Specific Training     []  Ready to Return to Play, Meets All Above Stages   []  Not Ready for Return to Sports   Comments:            Plan for next treatment session: progress quad strengthening    PLAN: See antonio. PT 2x / week for 6 weeks.    [x] Continue per plan of care [] Alter current plan (see comments)  [] Plan of care initiated [] Hold pending MD visit [] Discharge    Electronically signed by: Elder Collins, PTA [id number]

## 2021-05-21 ENCOUNTER — TELEPHONE (OUTPATIENT)
Dept: ORTHOPEDIC SURGERY | Age: 41
End: 2021-05-21

## 2021-05-21 NOTE — TELEPHONE ENCOUNTER
General Question     Subject: Patient advised that after two weeks of therapy there has been no progress there is still pain  Patient Heidyalexandra Herb"  Contact Number: [phone]

## 2021-05-21 NOTE — TELEPHONE ENCOUNTER
Called and informed pt of Sabas's message. Attempted to schedule. Pt declined as he needs to check his work schedule. Pt asked if he should attend PT this coming Tuesday. Per Aislinn Anthony, yes he should continue. Informed pt. He will call back to schedule a f/u visit.

## 2021-05-25 ENCOUNTER — HOSPITAL ENCOUNTER (OUTPATIENT)
Dept: PHYSICAL THERAPY | Age: 41
Setting detail: THERAPIES SERIES
Discharge: HOME | End: 2021-05-25
Payer: COMMERCIAL

## 2021-05-25 PROCEDURE — 97530 THERAPEUTIC ACTIVITIES: CPT | Performed by: PHYSICAL THERAPIST

## 2021-05-25 PROCEDURE — 97110 THERAPEUTIC EXERCISES: CPT | Performed by: PHYSICAL THERAPIST

## 2021-05-25 NOTE — FLOWSHEET NOTE
abduction 4-/5, hip flex 4/5, quad 4/5, HS 4/5, DF 4+/5  Improved HS and calf tighness    RESTRICTIONS/PRECAUTIONS:     Exercises/Interventions:   L knee pain, MMT, OA  Therapeutic Exercise (22683)  Resistance / level Sets/sec Reps Notes / Cues   Bike  #10 2 mph  6'    IB   2x30\"    HSS   2x30\" B    Foam:  NBOS EC  HR  Squat  March  tandem            30\"     15  15  20  1 ea           TG Squats with ball squeeze   25    tband side steps   add           Mat Ex:  LAQ  SLR  SLR  w ER  QS  Sl hip abd  Bridge with add set  SAQ   2#            2#    3x10 L  2x10 L    202x10 L     HEP                    Therapeutic Activities (61890)       FSU  LSU  Step down 6\"  6\"  6\"  15 L  15 L  15 L    CC: Hip ext, abd  CC: TKE 5#  17.5#  15 ea L/R  20x5\"    Rockerboard  Balance  F/B, s/s     30\"  20x           Neuromuscular Re-ed (88402)       BOS[facility]                     Manual Intervention (33228)       Patellar mobs       k-tape for improved patellar alignment 2x I strip crossed surrounding patella   Applied tape prior to ex this date   Roller STM to L hamstring/calf    Pt declined this date                            Pt. Education:  -pt educated on diagnosis, prognosis and expectations for rehab  -all pt questions were answered    Home Exercise Program:  Access Code: [email or web address]  URL: [email or web address]  Date: 05/04/2021  Prepared by: Crow Judd  Exercises  Supine Straight Leg Raises - 1 x daily - 7 x weekly - 1-2 sets - 10 reps  Straight Leg Raise with External Rotation - 1 x daily - 7 x weekly - 1-2 sets - 10 reps  Supine Quad Set - 1 x daily - 7 x weekly - 1-2 sets - 10 reps - 5 hold  Sidelying Hip Abduction - 1 x daily - 7 x weekly - 2 sets - 10 reps  Standing Hamstring Stretch on Chair - 1 x daily - 7 x weekly - 3 sets - 10 reps  Mini Squat with Counter Support - 1 x daily - 7 x weekly - 1 sets - 10 reps      Therapeutic Exercise and NMR EXR  [x] (70388) Provided verbal/tactile cueing for activities related to (13987) Vasopneumatic compression: Utilized vasopneumatic compression to decrease edema / swelling for the purpose of improving mobility and quad tone / recruitment which will allow for increased overall function including but not limited to self-care, transfers, ambulation, and ascending / descending stairs. Modalities:  5/20: CP to L knee on foam roll x 10'    Charges:  Timed Code Treatment Minutes: 50   Total Treatment Minutes: 60     [] EVAL - LOW (75301)   [] EVAL - MOD (61450)  [] EVAL - HIGH (51370)  [] RE-EVAL (12240)  [x] TE (37265) x2      [] Ionto (74664)  [] NMR (91411) x      [] Vaso (00344)  [] Manual (26066) x      [] Ultrasound  [x] TA (43579) x1      [] Mech Traction (20764)  [] Gait Training x     [] ES (un) (41320):   [] Aquatic therapy x   [] Other:   [] Group:     GOALS:   Patient stated goal: decreased pain, return to prior ADL level  [x]? Progressing: []? Met: []? Not Met: []? Adjusted     Therapist goals for Patient:   Short Term Goals: To be achieved in: 2 weeks  1. Independent in HEP and progression per patient tolerance, in order to prevent re-injury. [x]? Progressing: []? Met: []? Not Met: []? Adjusted  2. Patient will have a decrease in pain to facilitate improvement in movement, function, and ADLs as indicated by Functional Deficits. [x]? Progressing: []? Met: []? Not Met: []? Adjusted     Long Term Goals: To be achieved in: 6 weeks  1. Disability index score of 30% or less for the LEFS to assist with reaching prior level of function. [x]? Progressing: []? Met: []? Not Met: []? Adjusted  2. Patient will demonstrate increased AROM to 0-130 to allow for proper joint functioning as indicated by patients Functional Deficits. [x]? Progressing: []? Met: []? Not Met: []? Adjusted  3.  Patient will demonstrate an increase in Strength to at least 4+/5  as well as good proximal hip strength and control to allow for proper functional mobility as indicated by patients Functional

## 2021-05-26 ENCOUNTER — HOSPITAL ENCOUNTER (OUTPATIENT)
Dept: PHYSICAL THERAPY | Age: 41
Setting detail: THERAPIES SERIES
Discharge: HOME | End: 2021-05-26
Payer: COMMERCIAL

## 2021-05-26 PROCEDURE — 97530 THERAPEUTIC ACTIVITIES: CPT

## 2021-05-26 PROCEDURE — 97110 THERAPEUTIC EXERCISES: CPT

## 2021-05-26 NOTE — FLOWSHEET NOTE
[facility]  Phone: [phone]  Fax: [phone]    Physical Therapy Treatment Note/ Progress Report:     Date:  2021    Patient Name:  Alicia Adams    :  1980  MRN: [id number]  Restrictions/Precautions:    Medical/Treatment Diagnosis Information:  Diagnosis: M17.12, M 76.52, S83.242   L knee OA, patellar tendinitis, MMT  Treatment Diagnosis: L knee pain and weakness with MM involvement  Insurance/Certification information:  PT Insurance Information: BCBS  80/20%  30V PCY  Physician Information:  Referring Practitioner: Merilyn Boeck, PA  Plan of care signed (Y/N): []  Yes  [x]  No     Date of Patient follow up with Physician:      Progress Report: []  Yes  [x]  No     Date Range for reporting period:  Beginnin21  Ending:     Progress report due (10 Rx/or 30 days whichever is less):    Recertification due (POC duration/ or 90 days whichever is less):      Visit # Insurance Allowable Auth required? Date Range   8 30 []  Yes  []  No pcy     Latex Allergy:  [x]NO      []YES  Preferred Language for Healthcare:   [x]English       []other:    Functional Scale:       Date assessed:  LEFS: raw score = 28; dysfunction = 65%   21  LEFS: raw score = 26; dysfunction =67 %   21    Pain level: 6/10 avg     History:  Patient had L knee scope in 10, 2019 and was seen for PT until 2020. He continued at the gym until Covid when the gym was shut down. 2021 patient was helping move his parents and has had L knee pain since. She only had 1 day pain relief after cortisone injection last month. MD feels there is dengerative meniscal involvement with OA.  Patient was referred to PT at this time.     SUBJECTIVE: Pt reports that pain is still there but he's doing ok today    OBJECTIVE: 21: lateral patellar tracking cont, VMO weaker and delayed with QS  AROM L knee 1-125  L LE Strength:Hip abduction 4-/5, hip flex 4/5, quad vasopneumatic compression to decrease edema / swelling for the purpose of improving mobility and quad tone / recruitment which will allow for increased overall function including but not limited to self-care, transfers, ambulation, and ascending / descending stairs. Modalities:  5/26: CP to L knee on foam roll x 10'    Charges:  Timed Code Treatment Minutes: 45   Total Treatment Minutes: 55     [] EVAL - LOW (11119)   [] EVAL - MOD (75606)  [] EVAL - HIGH (28347)  [] RE-EVAL (18394)  [x] TE (46743) x2      [] Ionto (39104)  [] NMR (58800) x      [] Vaso (53812)  [] Manual (71098) x      [] Ultrasound  [x] TA (66356) x1      [] Mech Traction (39515)  [] Gait Training x     [] ES (un) (85730):   [] Aquatic therapy x   [] Other:   [] Group:     GOALS:   Patient stated goal: decreased pain, return to prior ADL level  [x]? Progressing: []? Met: []? Not Met: []? Adjusted     Therapist goals for Patient:   Short Term Goals: To be achieved in: 2 weeks  1. Independent in HEP and progression per patient tolerance, in order to prevent re-injury. [x]? Progressing: []? Met: []? Not Met: []? Adjusted  2. Patient will have a decrease in pain to facilitate improvement in movement, function, and ADLs as indicated by Functional Deficits. [x]? Progressing: []? Met: []? Not Met: []? Adjusted     Long Term Goals: To be achieved in: 6 weeks  1. Disability index score of 30% or less for the LEFS to assist with reaching prior level of function. [x]? Progressing: []? Met: []? Not Met: []? Adjusted  2. Patient will demonstrate increased AROM to 0-130 to allow for proper joint functioning as indicated by patients Functional Deficits. [x]? Progressing: []? Met: []? Not Met: []? Adjusted  3. Patient will demonstrate an increase in Strength to at least 4+/5  as well as good proximal hip strength and control to allow for proper functional mobility as indicated by patients Functional Deficits. [x]? Progressing: []? Met: []?  Not Met: []? Adjusted  4. Patient will return to functional activities including ease with stair ambulation and prolonged walking for his job and exercise without increased symptoms or restriction. [x]? Progressing: []? Met: []? Not Met: []? Adjusted  5. Patient will report 70% improvement in pain and function  [x]? Progressing: []? Met: []? Not Met: []? Adjusted            Overall Progression Towards Functional goals/ Treatment Progress Update:  [] Patient is progressing as expected towards functional goals listed. [] Progression is slowed due to complexities/Impairments listed. [] Progression has been slowed due to co-morbidities. [x] Plan just implemented, too soon to assess goals progression <30days   [] Goals require adjustment due to lack of progress  [] Patient is not progressing as expected and requires additional follow up with physician  [] Other    Persisting Functional Limitations/Impairments:  []Sitting [x]Standing   [x]Walking [x]Stairs   [x]Transfers [x]ADLs   [x]Squatting/bending [x]Kneeling  [x]Housework [x]Job related tasks  []Driving []Sports/Recreation   []Sleeping []Other:    ASSESSMENT: Adjusted reps/resistance based on tolerance and to prevent DOMS. Noticeably fatigued with CC hip strengthening. Less UE support needed with 6\" for/lat step ups as quad strength is slowly increasing. Pt was able to get a deeper pain-free squat than in previous visits.     Treatment/Activity Tolerance:  [x] Pt able to complete treatment [] Patient limited by fatique  [] Patient limited by pain  [] Patient limited by other medical complications  [] Other:     Prognosis:  [x] Good [] Fair  [] Poor    Patient Requires Follow-up: [x] Yes  [] No    Return to Play:    [x]  N/A   []  Stage 1: Intro to Strength   []  Stage 2: Return to Run and Strength   []  Stage 3: Return to Jump and Strength   []  Stage 4: Dynamic Strength and Agility   []  Stage 5: Sport Specific Training     []  Ready to Return to Play, Meets All Above Stages   []  Not Ready for Return to Sports   Comments:            Plan for next treatment session: progress quad strengthening    PLAN: See eval. PT 2x / week for 6 weeks.    [x] Continue per plan of care [] Alter current plan (see comments)  [] Plan of care initiated [] Hold pending MD visit [] Discharge    Electronically signed by: Gemma Martin, PTA [id number]

## 2021-05-28 ENCOUNTER — OFFICE VISIT (OUTPATIENT)
Dept: ORTHOPEDIC SURGERY | Age: 41
End: 2021-05-28
Payer: COMMERCIAL

## 2021-05-28 VITALS — HEIGHT: 73 IN | BODY MASS INDEX: 35.97 KG/M2 | WEIGHT: 271.4 LBS

## 2021-05-28 DIAGNOSIS — M84.362A STRESS FRACTURE OF LEFT TIBIA, INITIAL ENCOUNTER: ICD-10-CM

## 2021-05-28 DIAGNOSIS — S83.242D TEAR OF MEDIAL MENISCUS OF LEFT KNEE, CURRENT, UNSPECIFIED TEAR TYPE, SUBSEQUENT ENCOUNTER: Primary | ICD-10-CM

## 2021-05-28 PROCEDURE — 99213 OFFICE O/P EST LOW 20 MIN: CPT | Performed by: PHYSICIAN ASSISTANT

## 2021-06-04 ENCOUNTER — TELEPHONE (OUTPATIENT)
Dept: ORTHOPEDIC SURGERY | Age: 41
End: 2021-06-04

## 2021-06-04 NOTE — TELEPHONE ENCOUNTER
MRI LEFT KNEE no precert required - MILO     Called and informed pt of approval and that the information has been faxed over for him to schedule at his convenience. He understood. Also that he should call back after the MRI is scheduled to make a f/u visit.

## 2021-06-28 ENCOUNTER — OFFICE VISIT (OUTPATIENT)
Dept: ORTHOPEDIC SURGERY | Age: 41
End: 2021-06-28
Payer: COMMERCIAL

## 2021-06-28 VITALS — WEIGHT: 263 LBS | BODY MASS INDEX: 34.85 KG/M2 | HEIGHT: 73 IN

## 2021-06-28 DIAGNOSIS — M17.12 PRIMARY OSTEOARTHRITIS OF LEFT KNEE: ICD-10-CM

## 2021-06-28 DIAGNOSIS — M84.362A STRESS FRACTURE OF LEFT TIBIA, INITIAL ENCOUNTER: ICD-10-CM

## 2021-06-28 DIAGNOSIS — S83.242D TEAR OF MEDIAL MENISCUS OF LEFT KNEE, CURRENT, UNSPECIFIED TEAR TYPE, SUBSEQUENT ENCOUNTER: Primary | ICD-10-CM

## 2021-06-28 PROCEDURE — 99212 OFFICE O/P EST SF 10 MIN: CPT | Performed by: ORTHOPAEDIC SURGERY

## 2021-06-28 NOTE — PROGRESS NOTES
Nate Zepeda MD  [address], [facility] [address]  [address]  [address] [facility] Keysha Jenniferemmanuel Elizabeth  [phone]  Encounter Date: 6/28/2021  YOB: 1980    Chief Complaint   Patient presents with    Results     LT knee MRI results        History:Mr. Dolores Elizabeth is here in follow up regarding his left knee pain. Since his last visit, he has proceeded with a MRI of the left knee on 6/9/21 and is here today to review the results. He has previously proceeded with a cortisone injection, on 4/2/21, which only offered a day of relief. He has tried formal physical therapy as well, which also hasn't offered relief. His pain is focused on the medial aspect of his knee. His pain increases when he is up and walking. The pain occasionally wakes him at night. He takes ibuprofen at night, which offers him some relief         Exam:  Ht 6' 1\" (1.854 m)   Wt 263 lb (119.3 kg)   BMI 34.70 kg/m²   General: Alert and oriented x3, No acute distress, Cooperative and conversant. Mood and affect are appropriate. Left Knee : On examination of patient's left knee there is a mild joint effusion. Patient has tenderness on palpation over the medial joint line as well as over the medial tibial plateau and medial femoral condyle. Range of motion is 0 to 130 degrees. Special testing is negative. Radiology:     MRI of the left knee was obtained on 6/9/21 and reviewed today in office:  Conclusion :   1.  Grade 3-4 medial femorotibial weight-bearing chondromalacia, with penetrating erosions of the peripheral tibial plateau and anterior-central weight-bearing femoral condyle. 2.  Postsurgical changes consistent with prior subchondroplasty within the medial femoral condyle and the medial tibial plateau, without evidence of stress fracture or acute injury.     3.  Post menisectomy changes of the medial meniscus, with thin, chronic remnant tear versus conversion signal extending from mid-posterior horn to midbody. 4.  Mild swelling or low-grade sprain of the midbody PCL, with out full or partial-thickness tear. The ACL and collateral ligaments are intact. Assessment:   Diagnosis Orders   1. Tear of medial meniscus of left knee, current, unspecified tear type, subsequent encounter     2. Stress fracture of left tibia, initial encounter     3. Primary osteoarthritis of left knee  SYNVISC OR SYNVISC-ONE INJECTION (For Auth/Precert)       Orders  Orders Placed This Encounter   Procedures    SYNVISC OR SYNVISC-ONE INJECTION (For Auth/Precert)       Plan:  I have discussed treatment options with the patient. After reviewing his MRI, it is showing early arthritic chagnes. At this point, I do not believe that another arthroscopic surgery would offer relief. Down the road, he may need to proceed with a knee replacement. However, due to his age, I would recommend delaying this as long as possible. I informed him that the replacements last about 20 years. Due to having at least a day of relief from the cortisone injection, proves that the inflammation is from inside the knee. How to treat it beyond the cortisone injection, is we could proceed with a gel injection. Informed him that the gel injection will allow for more cushion in his knee and could give him up to 6 months of pain relief. The gel is made from the fluid that the knee is supposed to make. There are two separate options on how to proceed with the gel: one being a 3 series or a single shot injection. With the single injection, it tends to be thicker and the knee will feel stiffer and slightly more painful. Due to it only being the left knee, I would recommend proceeding with the single gel injection. If the gel offers him relief, we can repeat them about every 6 months and delay proceeding with a total knee replacement.   However, due to his insurance being [employer], they typically will no longer pay for them. There are other options like platelet rich plasma injections, which is not covered by insurance. I would still like to submit for gel injections, so he can continue his daily activities. Also informed him that any weight he could loose, could also help reduce the pain in his knee. He understands and accepts this course of care. By signing my name below, Zachariah Rice, attest that this documentation has been prepared under the direction and in the presence of Noa Ball MD.   Electronically Signed: Wilmar Quigley, 6/28/21, 8:02 AM EDT. Thais Carmona MD, personally performed the services described in this documentation. All medical record entries made by the scribe were at my direction and in my presence. I have reviewed the chart and discharge instructions (if applicable) and agree that the record reflects my personal performance and is accurate and complete. Noa Ball MD, 10/19/21, 3:41 PM EDT. Documentation was done using voice recognition dragon software. Every effort was made to ensure accuracy; however, inadvertent  Unintentional computerized transcription errors may be present.

## 2021-07-06 ENCOUNTER — TELEPHONE (OUTPATIENT)
Dept: ORTHOPEDIC SURGERY | Age: 41
End: 2021-07-06

## 2021-07-06 NOTE — TELEPHONE ENCOUNTER
07/06/2021  SYNVISC-ONE   & ADMIN CODE 14574. LEFT  KNEE. STATE COVERED POLICY - APPROVED, PER [employer]. NO AUTHORIZATION OR PRE-D REQUIRED. VALID & BILLABLE. CAN BUY& BILL. PER [employer] @ [address] [place] [address] REF # [id number].   AP

## 2021-07-14 ENCOUNTER — OFFICE VISIT (OUTPATIENT)
Dept: ORTHOPEDIC SURGERY | Age: 41
End: 2021-07-14
Payer: COMMERCIAL

## 2021-07-14 VITALS — HEIGHT: 73 IN | WEIGHT: 262 LBS | BODY MASS INDEX: 34.72 KG/M2

## 2021-07-14 DIAGNOSIS — M17.12 PRIMARY OSTEOARTHRITIS OF LEFT KNEE: Primary | ICD-10-CM

## 2021-07-14 DIAGNOSIS — S83.242D TEAR OF MEDIAL MENISCUS OF LEFT KNEE, CURRENT, UNSPECIFIED TEAR TYPE, SUBSEQUENT ENCOUNTER: ICD-10-CM

## 2021-07-14 PROCEDURE — 20610 DRAIN/INJ JOINT/BURSA W/O US: CPT | Performed by: PHYSICIAN ASSISTANT

## 2021-07-14 RX ORDER — LIDOCAINE HYDROCHLORIDE 10 MG/ML
5 INJECTION, SOLUTION INFILTRATION; PERINEURAL ONCE
Status: COMPLETED | OUTPATIENT
Start: 2021-07-14 | End: 2021-07-14

## 2021-07-14 RX ADMIN — LIDOCAINE HYDROCHLORIDE 5 ML: 10 INJECTION, SOLUTION INFILTRATION; PERINEURAL at 16:57

## 2021-07-14 NOTE — LETTER
[facility]  [address]  Phone: [phone]  Fax: [phone]    Nick Gregory        July 14, 2021     Patient: Viviana Vu   YOB: 1980   Date of Visit: 7/14/2021       To Whom It May Concern: It is my medical opinion that Viviana Vu may return to work on 7/24/21 with no repetitive stairs for 1 month. If you have any questions or concerns, please don't hesitate to call.     Sincerely,          Adrienne Billingsley PA-C

## 2021-07-14 NOTE — PROGRESS NOTES
Patient Name:Dmitry Wiley  Medical Record Number: [id number]  YOB: 1980  Date of Encounter: 7/14/2021     Chief Complaint   Patient presents with    Injections     Left knee, Synvisc One injection. History of Present Illness:  Shankar Vidal is a 37 y.o. male here for Synvisc 1 injection in the left knee. Vital Signs:  Ht 6' 1\" (1.854 m)   Wt 262 lb (118.8 kg)   BMI 34.57 kg/m²     Left Knee Examination:  There is a milld joint effusion noted of the left knee. There is mild pain with range of motion testing. There is no  significant instability noted. Neuro exam of both lower extremities is grossly intact . Intact sensation to light touch. Motor exam 4+ to 5/5 in all major motor groups. Negative Man's sign. Skin is warm, dry and intact with out erythema, induration or warmth around the left knee joint. Impression:  Degenerative joint disease of the left knee. Office Procedures:  Orders Placed This Encounter   Procedures    IL ARTHROCENTESIS ASPIR&/INJ MAJOR JT/BURSA W/O US     Injections:   Discussed Synvisc injections including all risks and benefits including increased pain, drug reaction, infection, bleeding, lack of improvement and neurovascular injury. Questions were encouraged and answered. The correct patient, procedure, site and side were identified. With the patient's permission, his left knee was prepped in standard sterile fashion with betadine and alcohol. The prefilled syringe of Synvisc One Hylan 48mg/6 mL G-F 20 was injected into the left superolateral joint space compartment under aseptic technique. The skin was then cleaned again with alcohol and a sterile adhesive dressing was applied. He tolerated this well. Treatment Plan:    The patient was advised to ice the left knee for 15-20 minutes to relieve any injection site related pain. Decrease activity for the next 24 to 48 hours. May use prescription or OTC pain relievers as needed.  Follow-up as directed or call or return to clinic if these symptoms worsen or fail to improve as anticipated. If at any time you are concerned you may contact the office for further instructions or care. Electronically signed by Larry Lo PA-C on 6/83/4628  Board Certified [facility]    Please note that portions of this note were completed with a voice recognition program.  Efforts were made to edit the dictations but occasionally words are mis-transcribed.

## 2022-03-22 ENCOUNTER — TELEPHONE (OUTPATIENT)
Dept: ORTHOPEDIC SURGERY | Age: 42
End: 2022-03-22

## 2022-03-22 NOTE — TELEPHONE ENCOUNTER
Spoke to patient and he was concerned with baseball season starting in a few weeks what is may be able to do activity wise, as his knee has been bothering him again in recently. I explained that we are not able to say what activity may bother him, and he should let pain be his guide with any activities. Suggested that he make an appointment to be re-evaluated as it has been 8 months since he was last seen, and coming up on a year since he was x-rayed. He stated he will see how he is doing once the season starts and will call if needed.

## (undated) DEVICE — 3.5 MM FULL RADIUS ELITE STRAIGHT                                    DISPOSABLE BLADES, BEIGE,PACKAGED 6                                    PER BOX, STERILE

## (undated) DEVICE — KIT SUBCHONDROPLASTY PROC SIDE TARGETED ACCUPORT
Type: IMPLANTABLE DEVICE | Site: KNEE | Status: NON-FUNCTIONAL
Removed: 2019-10-03

## (undated) DEVICE — SET IRRIG L94IN ID0.281IN W/ 4.5IN DST FLX CONN 2 LD ON OFF

## (undated) DEVICE — NEEDLE HYPO 18GA L1.5IN THN WALL PIVOTING SHLD BVL ORIENTED

## (undated) DEVICE — Z INACTIVE USE 2660664 SOLUTION IRRIG 3000ML 0.9% SOD CHL USP UROMATIC PLAS CONT

## (undated) DEVICE — SUTURE MCRYL SZ 3-0 L18IN ABSRB UD L19MM PS-2 3/8 CIR PRIM Y497G

## (undated) DEVICE — CHLORAPREP 26ML ORANGE

## (undated) DEVICE — GLOVE ORANGE PI 8 1/2   MSG9085

## (undated) DEVICE — Device

## (undated) DEVICE — MAT FLR ABS DISP

## (undated) DEVICE — Device: Brand: ACCUMIX® MIXING SYSTEM

## (undated) DEVICE — MEDI-VAC NON-CONDUCTIVE SUCTION TUBING: Brand: CARDINAL HEALTH

## (undated) DEVICE — DEVON TUBE HOLDER REMOVABLE TOUCH FASTEN STRAP: Brand: DEVON

## (undated) DEVICE — COVER,MAYO STAND,STERILE: Brand: MEDLINE

## (undated) DEVICE — ZIMMER® STERILE DISPOSABLE TOURNIQUET CUFF WITH PLC, DUAL PORT, SINGLE BLADDER, 34 IN. (86 CM)

## (undated) DEVICE — 3M™ STERI-STRIP™ REINFORCED ADHESIVE SKIN CLOSURES, R1547, 1/2 IN X 4 IN (12 MM X 100 MM), 6 STRIPS/ENVELOPE: Brand: 3M™ STERI-STRIP™